# Patient Record
Sex: FEMALE | Race: WHITE | Employment: UNEMPLOYED | URBAN - METROPOLITAN AREA
[De-identification: names, ages, dates, MRNs, and addresses within clinical notes are randomized per-mention and may not be internally consistent; named-entity substitution may affect disease eponyms.]

---

## 2017-11-17 PROBLEM — N81.10 FEMALE CYSTOCELE: Status: ACTIVE | Noted: 2017-11-17

## 2018-03-02 PROBLEM — N81.6 RECTOCELE: Status: ACTIVE | Noted: 2018-03-02

## 2018-11-01 ENCOUNTER — HOSPITAL ENCOUNTER (OUTPATIENT)
Dept: PREADMISSION TESTING | Age: 58
Discharge: HOME | End: 2018-11-01
Payer: COMMERCIAL

## 2018-11-01 LAB
ANION GAP SERPL CALC-SCNC: 8 MMOL/L (ref 3–18)
BUN SERPL-MCNC: 15 MG/DL (ref 7–18)
BUN/CREAT SERPL: 21
CALCIUM SERPL-MCNC: 9.4 MG/DL (ref 8.5–10.1)
CHLORIDE SERPL-SCNC: 103 MMOL/L (ref 100–108)
CO2 SERPL-SCNC: 29 MMOL/L (ref 21–32)
CREAT SERPL-MCNC: 0.72 MG/DL (ref 0.6–1.3)
ERYTHROCYTE [DISTWIDTH] IN BLOOD BY AUTOMATED COUNT: 12.8 % (ref 11.6–14.5)
GLUCOSE SERPL-MCNC: 81 MG/DL (ref 74–99)
HCT VFR BLD AUTO: 42.7 % (ref 35–45)
HGB BLD-MCNC: 14.1 G/DL (ref 12–16)
MCH RBC QN AUTO: 31.4 PG (ref 24–34)
MCHC RBC AUTO-ENTMCNC: 33 G/DL (ref 31–37)
MCV RBC AUTO: 95.1 FL (ref 74–97)
PLATELET # BLD AUTO: 158 K/UL (ref 135–420)
PMV BLD AUTO: 10.3 FL (ref 9.2–11.8)
POTASSIUM SERPL-SCNC: 3.9 MMOL/L (ref 3.5–5.5)
RBC # BLD AUTO: 4.49 M/UL (ref 4.2–5.3)
SODIUM SERPL-SCNC: 140 MMOL/L (ref 136–145)
WBC # BLD AUTO: 6.1 K/UL (ref 4.6–13.2)

## 2018-11-01 PROCEDURE — 93005 ELECTROCARDIOGRAM TRACING: CPT

## 2018-11-01 PROCEDURE — 80048 BASIC METABOLIC PNL TOTAL CA: CPT | Performed by: PLASTIC SURGERY

## 2018-11-01 PROCEDURE — 36415 COLL VENOUS BLD VENIPUNCTURE: CPT | Performed by: PLASTIC SURGERY

## 2018-11-01 PROCEDURE — 85027 COMPLETE CBC AUTOMATED: CPT | Performed by: PLASTIC SURGERY

## 2018-11-02 LAB
ATRIAL RATE: 65 BPM
CALCULATED P AXIS, ECG09: 51 DEGREES
CALCULATED R AXIS, ECG10: -35 DEGREES
CALCULATED T AXIS, ECG11: 79 DEGREES
DIAGNOSIS, 93000: NORMAL
P-R INTERVAL, ECG05: 128 MS
Q-T INTERVAL, ECG07: 474 MS
QRS DURATION, ECG06: 130 MS
QTC CALCULATION (BEZET), ECG08: 492 MS
VENTRICULAR RATE, ECG03: 65 BPM

## 2018-11-14 ENCOUNTER — ANESTHESIA EVENT (OUTPATIENT)
Dept: SURGERY | Age: 58
End: 2018-11-14
Payer: COMMERCIAL

## 2018-11-15 ENCOUNTER — HOSPITAL ENCOUNTER (OUTPATIENT)
Age: 58
Setting detail: OUTPATIENT SURGERY
Discharge: HOME | End: 2018-11-15
Attending: PLASTIC SURGERY | Admitting: PLASTIC SURGERY
Payer: COMMERCIAL

## 2018-11-15 ENCOUNTER — ANESTHESIA (OUTPATIENT)
Dept: SURGERY | Age: 58
End: 2018-11-15
Payer: COMMERCIAL

## 2018-11-15 VITALS
RESPIRATION RATE: 18 BRPM | SYSTOLIC BLOOD PRESSURE: 120 MMHG | TEMPERATURE: 98 F | OXYGEN SATURATION: 99 % | DIASTOLIC BLOOD PRESSURE: 76 MMHG | BODY MASS INDEX: 35.26 KG/M2 | HEIGHT: 63 IN | HEART RATE: 78 BPM | WEIGHT: 199 LBS

## 2018-11-15 PROCEDURE — 74011000250 HC RX REV CODE- 250: Performed by: PLASTIC SURGERY

## 2018-11-15 PROCEDURE — 77030002916 HC SUT ETHLN J&J -A: Performed by: PLASTIC SURGERY

## 2018-11-15 PROCEDURE — 76210000006 HC OR PH I REC 0.5 TO 1 HR: Performed by: PLASTIC SURGERY

## 2018-11-15 PROCEDURE — 77030012508 HC MSK AIRWY LMA AMBU -A: Performed by: ANESTHESIOLOGY

## 2018-11-15 PROCEDURE — 77030011825 HC SUPP SURG PSTOP S2SG -B: Performed by: PLASTIC SURGERY

## 2018-11-15 PROCEDURE — 77030020782 HC GWN BAIR PAWS FLX 3M -B: Performed by: PLASTIC SURGERY

## 2018-11-15 PROCEDURE — 74011250636 HC RX REV CODE- 250/636: Performed by: PLASTIC SURGERY

## 2018-11-15 PROCEDURE — 77030032490 HC SLV COMPR SCD KNE COVD -B: Performed by: PLASTIC SURGERY

## 2018-11-15 PROCEDURE — 77030002986 HC SUT PROL J&J -A: Performed by: PLASTIC SURGERY

## 2018-11-15 PROCEDURE — 77030013567 HC DRN WND RESERV BARD -A: Performed by: PLASTIC SURGERY

## 2018-11-15 PROCEDURE — 76010000131 HC OR TIME 2 TO 2.5 HR: Performed by: PLASTIC SURGERY

## 2018-11-15 PROCEDURE — 74011250636 HC RX REV CODE- 250/636

## 2018-11-15 PROCEDURE — 77010033678 HC OXYGEN DAILY

## 2018-11-15 PROCEDURE — 88305 TISSUE EXAM BY PATHOLOGIST: CPT

## 2018-11-15 PROCEDURE — 76210000024 HC REC RM PH II 2.5 TO 3 HR: Performed by: PLASTIC SURGERY

## 2018-11-15 PROCEDURE — 77030012407 HC DRN WND BARD -B: Performed by: PLASTIC SURGERY

## 2018-11-15 PROCEDURE — 77030008462 HC STPLR SKN PROX J&J -A: Performed by: PLASTIC SURGERY

## 2018-11-15 PROCEDURE — 76060000035 HC ANESTHESIA 2 TO 2.5 HR: Performed by: PLASTIC SURGERY

## 2018-11-15 PROCEDURE — 77030002933 HC SUT MCRYL J&J -A: Performed by: PLASTIC SURGERY

## 2018-11-15 RX ORDER — HYDROMORPHONE HYDROCHLORIDE 2 MG/ML
0.5 INJECTION, SOLUTION INTRAMUSCULAR; INTRAVENOUS; SUBCUTANEOUS
Status: DISCONTINUED | OUTPATIENT
Start: 2018-11-15 | End: 2018-11-15 | Stop reason: HOSPADM

## 2018-11-15 RX ORDER — MIDAZOLAM HYDROCHLORIDE 1 MG/ML
INJECTION, SOLUTION INTRAMUSCULAR; INTRAVENOUS AS NEEDED
Status: DISCONTINUED | OUTPATIENT
Start: 2018-11-15 | End: 2018-11-15 | Stop reason: HOSPADM

## 2018-11-15 RX ORDER — ONDANSETRON 2 MG/ML
INJECTION INTRAMUSCULAR; INTRAVENOUS AS NEEDED
Status: DISCONTINUED | OUTPATIENT
Start: 2018-11-15 | End: 2018-11-15 | Stop reason: HOSPADM

## 2018-11-15 RX ORDER — FENTANYL CITRATE 50 UG/ML
INJECTION, SOLUTION INTRAMUSCULAR; INTRAVENOUS AS NEEDED
Status: DISCONTINUED | OUTPATIENT
Start: 2018-11-15 | End: 2018-11-15 | Stop reason: HOSPADM

## 2018-11-15 RX ORDER — METOCLOPRAMIDE HYDROCHLORIDE 5 MG/ML
INJECTION INTRAMUSCULAR; INTRAVENOUS AS NEEDED
Status: DISCONTINUED | OUTPATIENT
Start: 2018-11-15 | End: 2018-11-15 | Stop reason: HOSPADM

## 2018-11-15 RX ORDER — SODIUM CHLORIDE, SODIUM LACTATE, POTASSIUM CHLORIDE, CALCIUM CHLORIDE 600; 310; 30; 20 MG/100ML; MG/100ML; MG/100ML; MG/100ML
100 INJECTION, SOLUTION INTRAVENOUS CONTINUOUS
Status: DISCONTINUED | OUTPATIENT
Start: 2018-11-15 | End: 2018-11-15 | Stop reason: HOSPADM

## 2018-11-15 RX ORDER — CLINDAMYCIN PHOSPHATE 900 MG/50ML
900 INJECTION, SOLUTION INTRAVENOUS ONCE
Status: COMPLETED | OUTPATIENT
Start: 2018-11-15 | End: 2018-11-15

## 2018-11-15 RX ORDER — HYDROMORPHONE HYDROCHLORIDE 1 MG/ML
INJECTION, SOLUTION INTRAMUSCULAR; INTRAVENOUS; SUBCUTANEOUS AS NEEDED
Status: DISCONTINUED | OUTPATIENT
Start: 2018-11-15 | End: 2018-11-15 | Stop reason: HOSPADM

## 2018-11-15 RX ORDER — CLINDAMYCIN PHOSPHATE 600 MG/50ML
600 INJECTION, SOLUTION INTRAVENOUS ONCE
Status: DISCONTINUED | OUTPATIENT
Start: 2018-11-15 | End: 2018-11-15 | Stop reason: DRUGHIGH

## 2018-11-15 RX ORDER — NALOXONE HYDROCHLORIDE 0.4 MG/ML
0.4 INJECTION, SOLUTION INTRAMUSCULAR; INTRAVENOUS; SUBCUTANEOUS AS NEEDED
Status: DISCONTINUED | OUTPATIENT
Start: 2018-11-15 | End: 2018-11-15 | Stop reason: HOSPADM

## 2018-11-15 RX ORDER — ONDANSETRON 2 MG/ML
4 INJECTION INTRAMUSCULAR; INTRAVENOUS ONCE
Status: DISCONTINUED | OUTPATIENT
Start: 2018-11-15 | End: 2018-11-15 | Stop reason: HOSPADM

## 2018-11-15 RX ORDER — PROPOFOL 10 MG/ML
INJECTION, EMULSION INTRAVENOUS AS NEEDED
Status: DISCONTINUED | OUTPATIENT
Start: 2018-11-15 | End: 2018-11-15 | Stop reason: HOSPADM

## 2018-11-15 RX ORDER — BACITRACIN 500 [USP'U]/G
OINTMENT TOPICAL AS NEEDED
Status: DISCONTINUED | OUTPATIENT
Start: 2018-11-15 | End: 2018-11-15 | Stop reason: HOSPADM

## 2018-11-15 RX ORDER — FLUMAZENIL 0.1 MG/ML
0.2 INJECTION INTRAVENOUS
Status: DISCONTINUED | OUTPATIENT
Start: 2018-11-15 | End: 2018-11-15 | Stop reason: HOSPADM

## 2018-11-15 RX ORDER — LIDOCAINE HYDROCHLORIDE 20 MG/ML
INJECTION, SOLUTION EPIDURAL; INFILTRATION; INTRACAUDAL; PERINEURAL AS NEEDED
Status: DISCONTINUED | OUTPATIENT
Start: 2018-11-15 | End: 2018-11-15 | Stop reason: HOSPADM

## 2018-11-15 RX ORDER — SODIUM CHLORIDE, SODIUM LACTATE, POTASSIUM CHLORIDE, CALCIUM CHLORIDE 600; 310; 30; 20 MG/100ML; MG/100ML; MG/100ML; MG/100ML
125 INJECTION, SOLUTION INTRAVENOUS CONTINUOUS
Status: DISCONTINUED | OUTPATIENT
Start: 2018-11-15 | End: 2018-11-15 | Stop reason: HOSPADM

## 2018-11-15 RX ORDER — FENTANYL CITRATE 50 UG/ML
50 INJECTION, SOLUTION INTRAMUSCULAR; INTRAVENOUS
Status: DISCONTINUED | OUTPATIENT
Start: 2018-11-15 | End: 2018-11-15 | Stop reason: HOSPADM

## 2018-11-15 RX ADMIN — SODIUM CHLORIDE, SODIUM LACTATE, POTASSIUM CHLORIDE, AND CALCIUM CHLORIDE: 600; 310; 30; 20 INJECTION, SOLUTION INTRAVENOUS at 08:18

## 2018-11-15 RX ADMIN — SODIUM CHLORIDE, SODIUM LACTATE, POTASSIUM CHLORIDE, AND CALCIUM CHLORIDE: 600; 310; 30; 20 INJECTION, SOLUTION INTRAVENOUS at 09:18

## 2018-11-15 RX ADMIN — ONDANSETRON 4 MG: 2 INJECTION INTRAMUSCULAR; INTRAVENOUS at 09:18

## 2018-11-15 RX ADMIN — MIDAZOLAM HYDROCHLORIDE 5 MG: 1 INJECTION, SOLUTION INTRAMUSCULAR; INTRAVENOUS at 07:33

## 2018-11-15 RX ADMIN — METOCLOPRAMIDE HYDROCHLORIDE 10 MG: 5 INJECTION INTRAMUSCULAR; INTRAVENOUS at 07:43

## 2018-11-15 RX ADMIN — LIDOCAINE HYDROCHLORIDE 80 MG: 20 INJECTION, SOLUTION EPIDURAL; INFILTRATION; INTRACAUDAL; PERINEURAL at 07:38

## 2018-11-15 RX ADMIN — PROPOFOL 200 MG: 10 INJECTION, EMULSION INTRAVENOUS at 07:38

## 2018-11-15 RX ADMIN — FENTANYL CITRATE 100 MCG: 50 INJECTION, SOLUTION INTRAMUSCULAR; INTRAVENOUS at 08:16

## 2018-11-15 RX ADMIN — SODIUM CHLORIDE, SODIUM LACTATE, POTASSIUM CHLORIDE, AND CALCIUM CHLORIDE 125 ML/HR: 600; 310; 30; 20 INJECTION, SOLUTION INTRAVENOUS at 06:47

## 2018-11-15 RX ADMIN — HYDROMORPHONE HYDROCHLORIDE 1 MG: 1 INJECTION, SOLUTION INTRAMUSCULAR; INTRAVENOUS; SUBCUTANEOUS at 07:33

## 2018-11-15 RX ADMIN — ONDANSETRON 4 MG: 2 INJECTION INTRAMUSCULAR; INTRAVENOUS at 07:43

## 2018-11-15 RX ADMIN — CLINDAMYCIN PHOSPHATE 900 MG: 900 INJECTION, SOLUTION INTRAVENOUS at 07:43

## 2018-11-15 RX ADMIN — HYDROMORPHONE HYDROCHLORIDE 1 MG: 1 INJECTION, SOLUTION INTRAMUSCULAR; INTRAVENOUS; SUBCUTANEOUS at 07:56

## 2018-11-15 NOTE — H&P
History and Physical 
 
Patient: Zhang Baez MRN: [id number]  SSN: [id number] YOB: 1960  Age: 66 y.o. Sex: female Subjective:  
  
Zhang Baez is a 66 y.o. female who has chronic back pain and hypermastia. Past Medical History:  
Diagnosis Date  Acute sinusitis  Ankylosing spondylitis ([address]  Arrhythmia WPW  Asthma   
 sports and allergy induced  Baker's cyst   
 knees  Carpal tunnel syndrome  Chronic pain  Crohn disease ([address]  Deep venous thrombosis ([address] 2009  
 right leg  Diverticulitis  Endometriosis  Fibromyalgia  GERD (gastroesophageal reflux disease)  Jayuya's disease  Heart burn  Hiatal hernia  Hypothyroid  IBD (inflammatory bowel disease)  Irregular heart beat  Nausea & vomiting  Osteoarthritis  Rectocele  Recurrent boils  Rheumatoid arthritis ([address]  SOBOE (shortness of breath on exertion)  TMJ (dislocation of temporomandibular joint)  Ulcerative colitis ([address] Charbel Vogt Parkinson White pattern seen on electrocardiogram   
 
Past Surgical History:  
Procedure Laterality Date  HX GYN    
 rectocele repair x 3  
 HX HEENT Septoplasty 2 [facility]  HX ORTHOPAEDIC Right   
 bunionectomy  HX [facility]AEDIC Right [facility]  HX UROLOGICAL  08/30/2007  
 cystoscopy , ureteral stent  HX UROLOGICAL    
 bladder repair Family History Problem Relation Age of Onset  Arthritis-rheumatoid Mother  Lung Disease Mother  Asthma Mother  Asthma Father  Lung Disease Sister  Diabetes Sister [address] [facility] Arthritis-rheumatoid Sister  Ulcerative Colitis Sister  MS Sister [address] [facility] Arthritis-rheumatoid Sister  Ulcerative Colitis Sister  Lung Disease Sister  Diabetes Brother Social History Tobacco Use  Smoking status: Never Smoker  Smokeless tobacco: Never Used Substance Use Topics  Alcohol use: No  
  
Prior to Admission medications Medication Sig Start Date End Date Taking? Authorizing Provider  
lorcaserin (BELVIQ XR) 20 mg Tb24 Take 20 mg by mouth daily. Yes Provider, Historical  
docusate sodium 100 mg tab Take 100 mg by mouth two (2) times a day. Yes Provider, Historical  
AZO CRANBERRY 975-42-64 mg-mg-million tab Take 1 Cap by mouth daily. Yes Provider, Historical  
calcium carbonate/vitamin D3 (CALCIUM 600 + D,3, PO) Take 1,200 mg by mouth daily. Yes Provider, Historical  
raNITIdine (ZANTAC) 150 mg tablet Take 150 mg by mouth two (2) times a day. Yes Provider, Historical  
levothyroxine (SYNTHROID) 88 mcg tablet Take  by mouth Daily (before breakfast). Yes Provider, Historical  
mesalamine (PENTASA) 500 mg CR capsule Take 1,000 mg by mouth two (2) times a day. Yes Provider, Historical  
folic acid (FOLVITE) 1 mg tablet Take 1 mg by mouth daily. Yes Provider, Historical  
esomeprazole (NEXIUM) 20 mg capsule Take 20 mg by mouth two (2) times a day. Yes Provider, Historical  
estradiol (ESTROGEL) 1.25 gram/actuation glpm 1 Dose by TransDERmal route daily. Yes Provider, Historical  
levocetirizine (XYZAL) 5 mg tablet Take 5 mg by mouth two (2) times a day. Yes Provider, Historical  
tiZANidine (ZANAFLEX) 2 mg capsule Take 2 mg by mouth three (3) times daily as needed. Yes Provider, Historical  
potassium 99 mg tablet Take 99 mg by mouth daily. Yes Provider, Historical  
cholecalciferol (VITAMIN D3) 1,000 unit cap Take 5,000 Units by mouth daily. Yes Provider, Historical  
Biotin 2,500 mcg cap Take 2,500 mcg by mouth daily. Yes Provider, Historical  
infliximab (REMICADE IV) 435 mg by IntraVENous route every six (6) weeks. Provider, Historical  
budesonide-formoterol (SYMBICORT) 160-4.5 mcg/actuation HFAA Take 2 Puffs by inhalation two (2) times daily as needed.     Provider, Historical  
 ondansetron hcl (ZOFRAN, AS HYDROCHLORIDE,) 4 mg tablet Take 1 Tab by mouth every eight (8) hours as needed for Nausea. 3/3/18   Deirdre Gannon MD  
methotrexate (RHEUMATREX) 2.5 mg tablet Take 7.5 mg by mouth Every Saturday. Provider, Historical  
lidocaine (LIDODERM) 5 % 1 Patch by TransDERmal route every twelve (12) hours as needed. Apply patch to the affected area for 12 hours a day and remove for 12 hours a day. Provider, Historical  
temazepam (RESTORIL) 15 mg capsule Take 15 mg by mouth nightly. Provider, Historical  
albuterol (PROAIR HFA) 90 mcg/actuation inhaler Take 2 Puffs by inhalation every four (4) hours as needed for Wheezing. Provider, Historical  
EPINEPHrine (EPIPEN) 0.3 mg/0.3 mL injection 0.3 mg by IntraMUSCular route once as needed. Provider, Historical  
hydrOXYzine HCl (ATARAX) 25 mg tablet Take 25 mg by mouth three (3) times daily as needed for Itching. Provider, Historical  
triamterene-hydroCHLOROthiazide (DYAZIDE) 37.5-25 mg per capsule Take 1 Cap by mouth as needed. Indications: Edema    Provider, Historical  
  
 
Allergies Allergen Reactions  Latex Hives At contact site  Other Food Hives and Other (comments) SUGAR, TOMATO, POTATO, CHICKEN, GARLIC, LETTUCE, MILK, TEA, PEANUTS, CHOCOLATE, CORN, BREWERS YEAST, EGGS, ONIONS, CARROT, RICE  Nickel Itching and Swelling  Penicillins Hives  Adhesive Tape-Silicones Hives Other reaction(s): Dermatological problems, e.g., rash, hives  Bee Venom Protein (Honey Bee) Hives and Rash  Ceclor [Cefaclor] Hives  Celery Itching and Swelling  Ciprofloxacin Hives  Egg Hives Can not take flu vaccine  Methylprednisolone Hives and Rash  Mold Hives  Mushroom Itching and Swelling  Neomycin Other (comments) allergy test  
 Nuts [Tree Nut] Itching and Swelling  Other Plant, Animal, Environmental Hives SEE LIST  Potato Rash and Swelling  Sulfa (Sulfonamide Antibiotics) Rash Break out in rash all over Review of Systems: A comprehensive review of systems was negative except for that written in the History of Present Illness. Objective:  
 
Vitals:  
 11/15/18 7082 BP: 119/74 Pulse: 68 Resp: 18 Temp: 97.6 °F (36.4 °C) SpO2: 100% Weight: 90.3 kg Height: 1.594 m Physical Exam: 
General:  Alert, cooperative, no distress, appears stated age. Eyes:  Conjunctivae/corneas clear. PERRL, EOMs intact. Fundi benign Ears:  Normal TMs and external ear canals both ears. Nose: Nares normal. Septum midline. Mucosa normal. No drainage or sinus tenderness. Mouth/Throat: Lips, mucosa, and tongue normal. Teeth and gums normal.  
Neck: Supple, symmetrical, trachea midline, no adenopathy, thyroid: no enlargment/tenderness/nodules, no carotid bruit and no JVD. Back:   Symmetric, no curvature. ROM normal. No CVA tenderness. Lungs:   Clear to auscultation bilaterally. Heart:  Regular rate and rhythm, S1, S2 normal, no murmur, click, rub or gallop. Abdomen:   Soft, non-tender. Bowel sounds normal. No masses,  No organomegaly. Extremities: Extremities normal, atraumatic, no cyanosis or edema. Pulses: 2+ and symmetric all extremities. Skin: Skin color, texture, turgor normal. No rashes or lesions Lymph nodes: Cervical, supraclavicular, and axillary nodes normal.  
Neurologic: CNII-XII intact. Normal strength, sensation and reflexes throughout. Assessment:  
 
Hospital Problems  Date Reviewed: 11/15/2018 None Plan:  
 
Bilateral breast reduction Signed By: Claudine Joseph MD   
 November 15, 2018

## 2018-11-15 NOTE — PERIOP NOTES
TRANSFER - OUT REPORT: 
 
Verbal report given to [facility], RN (name) on Faviola Whittington  being transferred to phase 2(unit) for routine post - op Report consisted of patients Situation, Background, Assessment and  
Recommendations(SBAR). Information from the following report(s) SBAR, Intake/Output and MAR was reviewed with the receiving nurse. Lines:  
Peripheral IV 11/15/18 Left Hand (Active) Site Assessment Clean, dry, & intact 11/15/2018 10:35 AM  
Phlebitis Assessment 0 11/15/2018 10:35 AM  
Infiltration Assessment 0 11/15/2018 10:35 AM  
Dressing Status Clean, dry, & intact 11/15/2018 10:35 AM  
Dressing Type Transparent;Tape 11/15/2018 10:35 AM  
Hub Color/Line Status Pink; Infusing 11/15/2018 10:35 AM  
  
 
Opportunity for questions and clarification was provided. Patient transported with: 
 Bday

## 2018-11-15 NOTE — DISCHARGE INSTRUCTIONS
Breast Reduction: What to Expect at Home  Your Recovery  Breast reduction surgery removes some of the breast tissue and skin from the breasts. This reshapes and lifts the breasts and reduces their size. It can also make the dark area around the nipple smaller. After surgery, you will probably feel weak. You may feel sore for 2 to 3 weeks. You also may feel pulling or stretching in your breast area. Although you may need pain medicine for a week or two, you can expect to feel better and stronger each day. For several weeks, you may get tired easily or have less energy than usual. You also may have the feeling that fluid is moving in your breasts. This feeling is normal and will go away over time. Stitches usually are removed in 5 to 10 days. Your new breasts may feel firmer and look rounder. Breast reduction may change the normal feeling in your breast. But in time, some feeling may return. Keep in mind that it may take time to get used to your new breasts. You will have swelling at first, but the breasts will soften and develop better shape over time. This care sheet gives you a general idea about how long it will take for you to recover. But each person recovers at a different pace. Follow the steps below to get better as quickly as possible. How can you care for yourself at home? Activity    · Rest when you feel tired. Getting enough sleep will help you recover.     · For about 2 weeks after surgery, avoid lifting anything that would make you strain. This may include heavy grocery bags and milk containers, a heavy briefcase or backpack, cat litter or dog food bags, a vacuum , or a child. Do not lift anything over your head for 2 to 3 weeks.     · Ask your doctor when you can drive again.     · Ask your doctor when it is okay for you to have sex.     · You can take your first shower the day after your drain or bandage is removed. This is usually within about 1 week.  Sometimes doctors say it is okay to shower the day after surgery. Do not take a bath or soak in a hot tub for about 4 weeks.     · You will probably be able to go back to work or your normal routine in 2 to 3 weeks. This depends on the type of work you do and any further treatment. Diet    · You can eat your normal diet. If your stomach is upset, try bland, low-fat foods like plain rice, broiled chicken, toast, and yogurt.     · Drink plenty of fluids (unless your doctor tells you not to).     · You may notice that your bowel movements are not regular right after your surgery. This is common. Try to avoid constipation and straining with bowel movements. Take a fiber supplement. If you have not had a bowel movement after a couple of days, take a mild laxative. Medicines    · Your doctor will tell you if and when you can restart your medicines. He or she will also give you instructions about taking any new medicines.     · If you take blood thinners, such as warfarin (Coumadin), clopidogrel (Plavix), or aspirin, be sure to talk to your doctor. He or she will tell you if and when to start taking those medicines again. Make sure that you understand exactly what your doctor wants you to do.     · Take pain medicines exactly as directed. ? If the doctor gave you a prescription medicine for pain, take it as prescribed. ? If you are not taking a prescription pain medicine, ask your doctor if you can take an over-the-counter medicine.     · If you think your pain medicine is making you sick to your stomach:  ? Take your medicine after meals (unless your doctor has told you not to). ? Ask your doctor for a different pain medicine.     · If you were given medicine for nausea, take it as directed.     · If your doctor prescribed antibiotics, take them as directed. Do not stop taking them just because you feel better. You need to take the full course of antibiotics.    Incision care    · If your doctor gave you specific instructions on how to care for your incision, follow those instructions.     · You may be wearing a special bra that holds your bandages in place after the surgery. Your doctor will tell you when you can stop wearing the bra. Your doctor may want you to wear the bra at night as well as during the day for several weeks. Do not wear an underwire bra for 1 month.     · If you have strips of tape on your incision, leave the tape on for a week or until it falls off. Or follow your doctor's instructions for removing the tape.     · Wash the area daily with warm, soapy water, and pat it dry. Don't use hydrogen peroxide or alcohol, which can slow healing.     · You may cover the area with a gauze bandage if it weeps or rubs against clothing. Change the bandage every day. Consider having someone help you with this. Exercise    · Try to walk each day. Start by walking a little more than you did the day before. Bit by bit, increase the amount you walk. Walking boosts blood flow and helps prevent pneumonia and constipation.     · Avoid strenuous activities, such as bicycle riding, jogging, weight lifting, or aerobic exercise, until your doctor says it is okay.     · Your doctor will tell you when to begin stretching exercises and normal activities.    Ice    · Put ice or a cold pack over your breast for 10 to 20 minutes at a time. Try to do this every 1 to 2 hours for the next 3 days (when you are awake) or until the swelling goes down. Put a thin cloth between the ice and your skin. Other instructions    · You may have one or more drains near your incisions. Your doctor will tell you how to take care of them. Drains are usually removed in the first week after surgery. Follow-up care is a key part of your treatment and safety. Be sure to make and go to all appointments, and call your doctor if you are having problems. It's also a good idea to know your test results and keep a list of the medicines you take. When should you call for help?   Call [phone] anytime you think you may need emergency care. For example, call if:    · You passed out (lost consciousness).     · You have sudden chest pain and shortness of breath, or you cough up blood.    Call your doctor now or seek immediate medical care if:    · You have pain that does not get better after you take pain medicine.     · You have loose stitches, or your incision comes open.     · You are bleeding from the incision.     · You have signs of infection, such as:  ? Increased pain, swelling, warmth, or redness. ? Red streaks leading from the incision. ? Pus draining from the incision. ? A fever.     · You have signs of a blood clot in your leg, such as:  ? Pain in your calf, back of the knee, thigh, or groin. ? Redness and swelling in your leg or groin.    Watch closely for any changes in your health, and be sure to contact your doctor if:    · You do not get better as expected. Where can you learn more? Go to http://charo-ghislaine.info/. Enter T113 in the search box to learn more about \"Breast Reduction: What to Expect at Home. \"  Current as of: April 18, 2018  Content Version: 11.8  © 0807-6617 [employer]. Care instructions adapted under license by [employer] (which disclaims liability or warranty for this information). If you have questions about a medical condition or this instruction, always ask your healthcare professional. [place] [address] any warranty or liability for your use of this information. DISCHARGE SUMMARY from Nurse    PATIENT INSTRUCTIONS:    After general anesthesia or intravenous sedation, for 24 hours or while taking prescription Narcotics:  · Limit your activities  · Do not drive and operate hazardous machinery  · Do not make important personal or business decisions  · Do  not drink alcoholic beverages  · If you have not urinated within 8 hours after discharge, please contact your surgeon on call.     Report the following to your surgeon:  · Excessive pain, swelling, redness or odor of or around the surgical area  · Temperature over 100.5  · Nausea and vomiting lasting longer than 4 hours or if unable to take medications  · Any signs of decreased circulation or nerve impairment to extremity: change in color, persistent  numbness, tingling, coldness or increase pain  · Any questions    What to do at Home:  Recommended activity: No heavy lifting, pushing, pulling until follow up with Dr. Constance Duverney    If you experience any of the following symptoms fever, chills, excessive pain, excessive drainage, foul odor coming from incision site please follow up with Dr. Constance Duverney. *  Please give a list of your current medications to your Primary Care Provider. *  Please update this list whenever your medications are discontinued, doses are      changed, or new medications (including over-the-counter products) are added. *  Please carry medication information at all times in case of emergency situations. These are general instructions for a healthy lifestyle:    No smoking/ No tobacco products/ Avoid exposure to second hand smoke  Surgeon General's Warning:  Quitting smoking now greatly reduces serious risk to your health. Obesity, smoking, and sedentary lifestyle greatly increases your risk for illness    A healthy diet, regular physical exercise & weight monitoring are important for maintaining a healthy lifestyle    You may be retaining fluid if you have a history of heart failure or if you experience any of the following symptoms:  Weight gain of 3 pounds or more overnight or 5 pounds in a week, increased swelling in our hands or feet or shortness of breath while lying flat in bed. Please call your doctor as soon as you notice any of these symptoms; do not wait until your next office visit.     Recognize signs and symptoms of STROKE:    F-face looks uneven    A-arms unable to move or move unevenly    S-speech slurred or non-existent    T-time-call 911 as soon as signs and symptoms begin-DO NOT go       Back to bed or wait to see if you get better-TIME IS BRAIN. Warning Signs of HEART ATTACK     Call 911 if you have these symptoms:   Chest discomfort. Most heart attacks involve discomfort in the center of the chest that lasts more than a few minutes, or that goes away and comes back. It can feel like uncomfortable pressure, squeezing, fullness, or pain.  Discomfort in other areas of the upper body. Symptoms can include pain or discomfort in one or both arms, the back, neck, jaw, or stomach.  Shortness of breath with or without chest discomfort.  Other signs may include breaking out in a cold sweat, nausea, or lightheadedness. Don't wait more than five minutes to call 911 - MINUTES MATTER! Fast action can save your life. Calling 911 is almost always the fastest way to get lifesaving treatment. Emergency Medical Services staff can begin treatment when they arrive -- up to an hour sooner than if someone gets to the hospital by car. The discharge information has been reviewed with the patient and caregiver. The patient and caregiver verbalized understanding. Discharge medications reviewed with the patient and caregiver and appropriate educational materials and side effects teaching were provided.   ___________________________________________________________________________________________________________________________________

## 2018-11-15 NOTE — ANESTHESIA POSTPROCEDURE EVALUATION
Post-Anesthesia Evaluation & Assessment Visit Vitals /71 Pulse 81 Temp 36.2 °C (97.2 °F) Resp 12 Ht 5' 2.75\" (1.594 m) Wt 90.3 kg (199 lb) SpO2 94% BMI 35.53 kg/m² Nausea/Vomiting: Controlled. Post-operative hydration adequate. Pain Scale 1: Visual (11/15/18 1038) Pain Intensity 1: 0 (11/15/18 1038) Managed Pain score at or below stated goal level. Mental status & Level of consciousness: alert and oriented x 3 Neurological status: moves all extremities, sensation grossly intact Pulmonary status: airway patent, adequate oxygenation. Complications related to anesthesia: none Patient has met all PACU discharge requirements.  
 
 
Michelle Lopez DO

## 2018-11-15 NOTE — PERIOP NOTES
Incision made on right breast at 0757 . Second incision made on left breast at 0826. Both incisions closed at 0940.

## 2018-11-15 NOTE — BRIEF OP NOTE
BRIEF OPERATIVE NOTE Date of Procedure: 11/15/2018 Preoperative Diagnosis: MACROMASTIA Postoperative Diagnosis: MACROMASTIA Procedure(s): BILATERAL BREAST REDUCTION **SPEC POP** Surgeon(s) and Role: Abelardo Hernandez MD - Primary Surgical Assistant: none Surgical Staff: 
Circ-1: Gem Valle RN 
Circ-Relief: Sara Espinoza RN Scrub Tech-1: Nadia Marsh Scrub RN-1: Antony Siu RN Surg Asst-1: Magaly Kern Event Time In Time Out Incision Start 7351 Incision Close 0940 Anesthesia: General  
Estimated Blood Loss: 200cc Specimens:  
ID Type Source Tests Collected by Time Destination 1 : right breast tissue Preservative Breast  Manas Polanco MD 11/15/2018 0825 Pathology 2 : left breast tissue  Preservative Breast  Manas Polanco MD 11/15/2018 1996 Pathology Findings: hypermastia Complications: none Implants: * No implants in log *

## 2018-11-15 NOTE — ANESTHESIA PREPROCEDURE EVALUATION
Anesthetic History PONV Pertinent negatives: No increased risk of difficult airway, pseudocholinesterase deficiency, malignant hyperthermia and history of awareness of surgery under anesthesia Review of Systems / Medical History Patient summary reviewed, nursing notes reviewed and pertinent labs reviewed Pulmonary Asthma : well controlled Pertinent negatives: No COPD, recent URI, sleep apnea and smoker Neuro/Psych Within defined limits Cardiovascular Dysrhythmias Pertinent negatives: No hypertension, past MI, CAD, PAD, angina and CHF Exercise tolerance: >4 METS Comments: WPW  
GI/Hepatic/Renal 
  
GERD: well controlled Pertinent negatives: No hepatitis, liver disease and renal disease Endo/Other Hypothyroidism: well controlled Obesity and arthritis Pertinent negatives: No diabetes, hyperthyroidism, morbid obesity and blood dyscrasia Other Findings Comments: Ankylosing spondylitis, RA Physical Exam 
 
Airway Mallampati: II 
TM Distance: 4 - 6 cm Neck ROM: decreased range of motion Mouth opening: Normal 
 
 Cardiovascular Regular rate and rhythm,  S1 and S2 normal,  no murmur, click, rub, or gallop Dental 
No notable dental hx Pulmonary Breath sounds clear to auscultation Abdominal 
GI exam deferred Other Findings Anesthetic Plan ASA: 3 Anesthesia type: general 
 
 
 
 
Induction: Intravenous Anesthetic plan and risks discussed with: Patient and Spouse GA/LMA

## 2018-11-15 NOTE — PERIOP NOTES
Patient discharged via wheelchair to POV, NAD, accompanied by . Both verbalized understanding of all discharge instructions, opportunity for questions offered. Patient armband removed and shredded

## 2018-11-16 NOTE — OP NOTES
[facility] 
OPERATIVE REPORT Yulissa Miranda 
MR#: [id number] : 1960 ACCOUNT #: [de-identified] DATE OF SERVICE: 2018 PREOPERATIVE DIAGNOSIS:  Hypermastia. POSTOPERATIVE DIAGNOSIS:  Hypermastia. PROCEDURE PERFORMED:  Bilateral breast reduction. SURGEON:  Billy Bustillos MD 
 
ANESTHESIA:  General. 
 
ESTIMATED BLOOD LOSS:  200 mL SPECIMENS REMOVED:  Right breast tissue 795 g, left breast tissue 818 g. ASSISTANTS:  Diego Bravo COMPLICATIONS:  None. DISPOSITION:  Home. IMPLANTS:  ***. DESCRIPTION:  The patient was identified and marked in the preoperative holding area. She had SCD stockings placed bilaterally, was given IV antibiotics preoperatively. She was taken to the operating room, placed in the supine position, put under general anesthesia without difficulty. Her chest was prepped and draped in the usual sterile fashion. Beginning on the right side, the areolar diameter was marked with a 38 mm template. The inferior pedicle was deepithelialized. Skin flaps were developed superiorly down to the chest wall. The breast was reduced by removing medial, superior and lateral breast tissue en bloc with the overlying skin. Bleeding was meticulously controlled with electrocautery. Total amount of breast tissue removed from the right side was 795 g. This was then repeated for the left side where 818 g was removed. A 19 round Sukhi drains were placed through inferolateral stab incisions and sewn into place with 2-0 nylon sutures. The skin flaps were then closed with a deep layer of 3-0 Monocryl running subcuticular 4-0 Monocryl. Bacitracin, Adaptic, ABDs and postop bra was applied. The patient tolerated the procedure well and was taken awake and alert to the recovery room in stable condition. Maria E Lantigua MD dictating on behalf of MD JOHN Montilla / MIHAI 
D: 11/15/2018 09:44    
T: 2018 01:07 
 JOB #: [id number] CC:  Zbigniew Faith MD

## 2018-11-20 NOTE — OP NOTES
[address] REPORT    Jessica Carnes  MR#: [id number]  : 1960  ACCOUNT #: [de-identified]   DATE OF SERVICE: 11/15/2018    PREOPERATIVE DIAGNOSIS:  Hypermastia. POSTOPERATIVE DIAGNOSIS:  Hypermastia. PROCEDURE PERFORMED:  Bilateral breast reduction. SURGEON:  Yelena Stout MD    ANESTHESIA:  General.    ESTIMATED BLOOD LOSS:  200 mL    SPECIMENS REMOVED:  Right breast tissue 795 g, left breast tissue 818 g. ASSISTANTS:  Chio Robbins    COMPLICATIONS:  None. DISPOSITION:  Home. IMPLANTS:  None+. DESCRIPTION:  The patient was identified and marked in the preoperative holding area. She had SCD stockings placed bilaterally, was given IV antibiotics preoperatively. She was taken to the operating room, placed in the supine position, put under general anesthesia without difficulty. Her chest was prepped and draped in the usual sterile fashion. Beginning on the right side, the areolar diameter was marked with a 38 mm template. The inferior pedicle was deepithelialized. Skin flaps were developed superiorly down to the chest wall. The breast was reduced by removing medial, superior and lateral breast tissue en bloc with the overlying skin. Bleeding was meticulously controlled with electrocautery. Total amount of breast tissue removed from the right side was 795 g. This was then repeated for the left side where 818 g was removed. A 19 round Sukhi drains were placed through inferolateral stab incisions and sewn into place with 2-0 nylon sutures. The skin flaps were then closed with a deep layer of 3-0 Monocryl running subcuticular 4-0 Monocryl. Bacitracin, Adaptic, ABDs and postop bra was applied. The patient tolerated the procedure well and was taken awake and alert to the recovery room in stable condition. Dennise Odonnell MD dictating on behalf of MD JOHN Hughes / MIHAI  D: 11/15/2018 09:44     T: 2018 01:07  JOB #: [id number]

## 2018-11-20 NOTE — OP NOTES
[address] 
OPERATIVE REPORT Marylou Armstrong 
MR#: [id number] : 1960 ACCOUNT #: [de-identified] DATE OF SERVICE: 11/15/2018 PREOPERATIVE DIAGNOSIS:  Hypermastia. POSTOPERATIVE DIAGNOSIS:  Hypermastia. PROCEDURE PERFORMED:  Bilateral breast reduction. SURGEON:  Yazan Ahumada MD 
 
ANESTHESIA:  General. 
 
ESTIMATED BLOOD LOSS:  200 mL SPECIMENS REMOVED:  Right breast tissue 795 g, left breast tissue 818 g. ASSISTANTS:  Gloria Flores COMPLICATIONS:  None. DISPOSITION:  Home. IMPLANTS:  ***. DESCRIPTION:  The patient was identified and marked in the preoperative holding area. She had SCD stockings placed bilaterally, was given IV antibiotics preoperatively. She was taken to the operating room, placed in the supine position, put under general anesthesia without difficulty. Her chest was prepped and draped in the usual sterile fashion. Beginning on the right side, the areolar diameter was marked with a 38 mm template. The inferior pedicle was deepithelialized. Skin flaps were developed superiorly down to the chest wall. The breast was reduced by removing medial, superior and lateral breast tissue en bloc with the overlying skin. Bleeding was meticulously controlled with electrocautery. Total amount of breast tissue removed from the right side was 795 g. This was then repeated for the left side where 818 g was removed. A 19 round Sukhi drains were placed through inferolateral stab incisions and sewn into place with 2-0 nylon sutures. The skin flaps were then closed with a deep layer of 3-0 Monocryl running subcuticular 4-0 Monocryl. Bacitracin, Adaptic, ABDs and postop bra was applied. The patient tolerated the procedure well and was taken awake and alert to the recovery room in stable condition. Lynnette Elliott MD dictating on behalf of MD JOHN Perry / MIHAI 
D: 11/15/2018 09:44    
T: 2018 01:07 
 JOB #: [id number]

## 2019-08-09 ENCOUNTER — HOSPITAL ENCOUNTER (OUTPATIENT)
Dept: PREADMISSION TESTING | Age: 59
Discharge: HOME | End: 2019-08-09
Payer: COMMERCIAL

## 2019-08-09 LAB
ALBUMIN SERPL-MCNC: 4 G/DL (ref 3.4–5)
ALBUMIN/GLOB SERPL: 1.2 {RATIO} (ref 0.8–1.7)
ALP SERPL-CCNC: 80 U/L (ref 45–117)
ALT SERPL-CCNC: 23 U/L (ref 13–56)
ANION GAP SERPL CALC-SCNC: 8 MMOL/L (ref 3–18)
AST SERPL-CCNC: 19 U/L (ref 10–38)
ATRIAL RATE: 66 BPM
BASOPHILS # BLD: 0 K/UL (ref 0–0.1)
BASOPHILS NFR BLD: 0 % (ref 0–2)
BILIRUB SERPL-MCNC: 0.5 MG/DL (ref 0.2–1)
BUN SERPL-MCNC: 12 MG/DL (ref 7–18)
BUN/CREAT SERPL: 14 (ref 12–20)
CALCIUM SERPL-MCNC: 9 MG/DL (ref 8.5–10.1)
CALCULATED P AXIS, ECG09: 47 DEGREES
CALCULATED R AXIS, ECG10: -19 DEGREES
CALCULATED T AXIS, ECG11: 117 DEGREES
CHLORIDE SERPL-SCNC: 108 MMOL/L (ref 100–111)
CO2 SERPL-SCNC: 29 MMOL/L (ref 21–32)
CREAT SERPL-MCNC: 0.84 MG/DL (ref 0.6–1.3)
DIAGNOSIS, 93000: NORMAL
DIFFERENTIAL METHOD BLD: NORMAL
EOSINOPHIL # BLD: 0.1 K/UL (ref 0–0.4)
EOSINOPHIL NFR BLD: 1 % (ref 0–5)
ERYTHROCYTE [DISTWIDTH] IN BLOOD BY AUTOMATED COUNT: 13.8 % (ref 11.6–14.5)
GLOBULIN SER CALC-MCNC: 3.4 G/DL (ref 2–4)
GLUCOSE SERPL-MCNC: 96 MG/DL (ref 74–99)
HCT VFR BLD AUTO: 41.4 % (ref 35–45)
HGB BLD-MCNC: 13.7 G/DL (ref 12–16)
LYMPHOCYTES # BLD: 2.2 K/UL (ref 0.9–3.6)
LYMPHOCYTES NFR BLD: 37 % (ref 21–52)
MCH RBC QN AUTO: 31.4 PG (ref 24–34)
MCHC RBC AUTO-ENTMCNC: 33.1 G/DL (ref 31–37)
MCV RBC AUTO: 95 FL (ref 74–97)
MONOCYTES # BLD: 0.5 K/UL (ref 0.05–1.2)
MONOCYTES NFR BLD: 8 % (ref 3–10)
NEUTS SEG # BLD: 3.2 K/UL (ref 1.8–8)
NEUTS SEG NFR BLD: 54 % (ref 40–73)
P-R INTERVAL, ECG05: 182 MS
PLATELET # BLD AUTO: 152 K/UL (ref 135–420)
PMV BLD AUTO: 10.6 FL (ref 9.2–11.8)
POTASSIUM SERPL-SCNC: 4.2 MMOL/L (ref 3.5–5.5)
PROT SERPL-MCNC: 7.4 G/DL (ref 6.4–8.2)
Q-T INTERVAL, ECG07: 402 MS
QRS DURATION, ECG06: 84 MS
QTC CALCULATION (BEZET), ECG08: 421 MS
RBC # BLD AUTO: 4.36 M/UL (ref 4.2–5.3)
SODIUM SERPL-SCNC: 145 MMOL/L (ref 136–145)
VENTRICULAR RATE, ECG03: 66 BPM
WBC # BLD AUTO: 6 K/UL (ref 4.6–13.2)

## 2019-08-09 PROCEDURE — 93005 ELECTROCARDIOGRAM TRACING: CPT

## 2019-08-09 PROCEDURE — 85025 COMPLETE CBC W/AUTO DIFF WBC: CPT

## 2019-08-09 PROCEDURE — 36415 COLL VENOUS BLD VENIPUNCTURE: CPT

## 2019-08-09 PROCEDURE — 80053 COMPREHEN METABOLIC PANEL: CPT

## 2019-08-13 ENCOUNTER — HOSPITAL ENCOUNTER (OUTPATIENT)
Dept: MAMMOGRAPHY | Age: 59
Discharge: HOME | End: 2019-08-13
Attending: FAMILY MEDICINE
Payer: COMMERCIAL

## 2019-08-13 DIAGNOSIS — E03.9 HYPOTHYROIDISM, UNSPECIFIED: ICD-10-CM

## 2019-08-13 PROCEDURE — 77080 DXA BONE DENSITY AXIAL: CPT

## 2019-08-16 NOTE — H&P
Patient Name:   Mele Barreto  Account #:  [de-identified]  YOB: 1960    Chief Complaint:  Left shoulder pain; follow up MRI. History of Chief Complaint:  She had the MRI done for her left shoulder. This study is reviewed. It shows tendinosis with some fraying of the bursal surface involving the supraspinatus with bursitis, a small glenohumeral effusion, tendinosis and tenosynovitis of the long head of the biceps, and mild DJD of the AC joint. 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    Allergies, environmental      DL 07/10/2019 -feather mix, bermuda grass, pine, gum, sycamore, cedar, juniper, hickory, cottonwood, white gilberto, cotton linter, red oak, maple mix, green gilberto, maple [place] oak, elm, birch, walnut   Animal allergy      DL 07/10/2019 -mosquito, dust mites, housemites, Fannia, cats, cockroach   Fibromyalgia 2009        GERD         Rheumatoid arthritis         Spinal stenosis         Thyroid disease         Sinusitis         TMJ disorder         Ankylosing spondylitis 2009        Autoimmune disorder         Blepharitis         Carpal tunnel syndrome         Chronic pain syndrome 1992        Crohn's disease         Diverticulitis         Endometriosis         Tammi Danlos syndrome 1992        Occipital neuralgia         Hiatal hernia         Hypothyroidism due to Hashimoto's thyroiditis 2008        IBD - Inflammatory bowel disease         Rheumatoid arthritis 1993        Vaginal hernia  left       [place] Parkinson White syndrome         Asthma         IC - Interstitial cystitis         Lumbar disc degenerative disease         Myalgia/myositis NOS 2018        Lumbar disc displacement         Sacroiliitis            Breast reduction 11/15/2018 bilateral       Bunionectomy 7/97 right       Calcaneal fx repair, 2 toes  right       Cystocele back wall repair 03/02/2018        Cystocele repair 11/17/2017        Cystoscopy w/tissues repair 2016        Hysterectomy 1995        Nasal septoplasty 2012        Rectocele repair 2005        Septoplasty, turbinate reduction, 2016 bilateral       Thumb surgery 2012 right       Tonsillectomy 1994        Transobturator sling & cystoscopy 2007        Turbinate reduction 2012       Allergies:    Ingredient Reaction Medication Name Comment   LEVOFLOXACIN Hives / Skin Rash; Edema Levaquin    BACITRACIN ZINC Hives / Skin Rash; Edema Neosporin ([id number])    ADHESIVE Hives / Skin Rash; Edema  \"please use paper tape\"   NEOMYCIN Hives / Skin Rash; Edema     NICKEL SULFATE Hives / Skin Rash; Edema     NSAIDS (NON-STEROIDAL ANTI-INFLAMMATORY DRUG) Hives / Skin Rash; Edema  ALL   METHYLPREDNISOLONE Hives / Skin Rash; Edema Medrol    LATEX Hives / Skin Rash; Edema     CEFACLOR Hives / Skin Rash; Edema     NEOMYCIN SULFATE Hives / Skin Rash; Edema Neosporin ([id number])    BEE VENOM PROTEIN (HONEY BEE) Hives / Skin Rash; Edema     POLYMYXIN B Hives / Skin Rash; Edema Neosporin ([id number])    EGG Hives / Skin Rash; Edema     SULFA (SULFONAMIDE ANTIBIOTICS) Hives / Skin Rash; Edema     IBUPROFEN Hives / Skin Rash; Edema     AMOXICILLIN Hives / Skin Rash; Edema     PENICILLINS Hives / Skin Rash; Edema     BACITRACIN Hives / Skin Rash; Edema Neosporin ([id number])    TAPE, OCCLUSIVE ADHESIVE Hives / Skin Rash; Edema         Current Medications:    Medication Directions   Remicade 100 mg intravenous solution infuse (435MG/KG)  by intravenous route  every 7 weeks over no less than   hydroxyzine HCl 25 mg tablet take 1 tablet by oral route  every 6 hours as needed for itching   lidocaine 6% cream apply 3 grams 2-3 times daily   Pentasa 500 mg capsule,controlled release take 3 capsule by oral route 2 times every day   ProAir HFA 90 mcg/actuation aerosol inhaler inhale 2 puff by inhalation route  every 4 - 6 hours as needed   ranitidine 150 mg tablet take 1 tablet by oral route 2 times every day   triamterene 37.5 mg-hydrochlorothiazide 25 mg tablet take 1 tablet by oral route  every day   calcium carbonate take 1200mg by oral route every day   Vitamin D3 5,000 unit tablet take 1 tablet by oral route  every day   potassium gluconate 600 mg (99 mg) tablet take 1 daily   Nexium take 22.3mg-2 tablets a day every 12 hours   MegaRed Omega-3 Krill Oil    Move Free Ultra (boron) 40 mg-5 mg-3.3 mg tablet    Move Free Joint Health 750 mg-100 mg-1.65 mg-108 mg tablet    epinephrine 0.3 mg/0.3 mL injection, auto-injector as needed   nystatin 100,000 unit/gram topical cream    terconazole 0.4 % vaginal cream    fluconazole 200 mg tablet    Belviq XR 20 mg tablet,extended release take 1 tablet by oral route  every day   Monurol 3 gram oral packet    Divigel 1 mg/gram (0.1 %) transdermal gel packet apply 1 packet by topical route  every day to upper thigh   nitrofurantoin monohydrate/macrocrystals 100 mg capsule    clindamycin 2 % vaginal cream    Synthroid 112 mcg tablet take 1 tablet by oral route  every day   folic acid 1 mg tablet take 1 tablet by oral route  every day   levocetirizine 5 mg tablet take 1 tablet by oral route  every 12 hours   temazepam 15 mg capsule take 1 capsule by oral route  every day at bedtime as needed   methotrexate sodium 2.5 mg tablet take 8 tablets by oral route once weekly     Social History:    SMOKING  Status Tobacco Type Units Per Day Yrs Used   Never smoker      ALCOHOL  There is no history of alcohol use.      Family History:    Disease Detail Family Member Age Cause of Death Comments   Family history of Alcoholism   N    Arthritis Mother  N Arthritis Father  N    Family history of Arthritis   N    Asthma Father  N    Family history of Asthma   N    Family history of Diabetes   N    Family history of Hypertension   N    Rheumatoid arthritis Mother  N    Rheumatoid arthritis Father  N    Family history of Rheumatoid arthritis   N    Family history of Seizure disorder   N    Family history of Substance abuse   N    Rheumatoid arthritis Sister  N    Ulcerative colitis Sister  N    Fibromatosis Sister  N    COPD Sister  N    Diabetes mellitus Sister  N    Multiple sclerosis Sister  N    Rheumatoid arthritis Sister  N    Crohn's disease Sister  N    Graves disease Sister  N    Sarcoidosis Sister  N    ABD Sister  N    Diverticulosis Sister  N    Bipolar disorder Sister  N    Family history of Autoimmune disease   N    Family history of Carpal tunnel   N    Family history of Cataracts   N    Family history of Deep venous thrombosis   N    Family history of Diverticulosis   N    Family history of Eczema   N    Family history of Endometriosis   N    Family history of Fibromyalgia   N    Family history of Gout   N    Family history of Glaucoma   N    Family history of Graves disease   N    Family history of Hiatal hernia   N    Family history of Hyperparathyroidism   N    Family history of Inflammatory bowel disease   N    Family history of Irritable bowel syndrome   N    Family history of Multiple sclerosis   N    Family history of Ulcerative colitis   N    Family history of Bacon Parkinson White   N      Review of Systems:    Pertinent negatives include fever, fainting and swelling of feet. Vitals:  Date BP Pulse Temp (F) Resp. (per min.) Height (Total in.) Weight (lbs.) BMI   07/11/2019     63.00  35.07     Physical Examination:  General:  Patient in no acute distress. Vital Signs: See database for vital signs. HEENT: Normal.  Neck: Supple. Chest: Clear. Heart: Regular sinus rhythm. Abdomen: Soft, nontender, no adenopathy.    Neurologic: Normal exam.  Extremities:    Physical exam of the left shoulder shows limited overall motion reaching about 100 degrees of forward flexion and 80 degrees of abduction. She has generally good rotation. She has slight pain with thumb down abduction and slight pain with resisted abduction. Impression:  Left shoulder impingement, partial cuff tear, and biceps tendinosis. Plan:  She has had failure of conservative care including physical therapy and multiple cortisone injections. She will be scheduled for left shoulder arthroscopic decompression, possible resection of distal clavicle, probable cuff debridement, possible repair and probable biceps release. She understands the risks and benefits of the procedure. She is ready to proceed. She was given a sling. Postop, she will get Dilaudid.

## 2019-08-19 ENCOUNTER — ANESTHESIA (OUTPATIENT)
Dept: SURGERY | Age: 59
End: 2019-08-19
Payer: COMMERCIAL

## 2019-08-19 ENCOUNTER — ANESTHESIA EVENT (OUTPATIENT)
Dept: SURGERY | Age: 59
End: 2019-08-19
Payer: COMMERCIAL

## 2019-08-19 ENCOUNTER — HOSPITAL ENCOUNTER (OUTPATIENT)
Age: 59
Setting detail: OUTPATIENT SURGERY
Discharge: HOME | End: 2019-08-19
Attending: ORTHOPAEDIC SURGERY | Admitting: ORTHOPAEDIC SURGERY
Payer: COMMERCIAL

## 2019-08-19 VITALS
TEMPERATURE: 97.5 F | RESPIRATION RATE: 18 BRPM | HEART RATE: 64 BPM | HEIGHT: 63 IN | WEIGHT: 198.38 LBS | SYSTOLIC BLOOD PRESSURE: 118 MMHG | OXYGEN SATURATION: 96 % | DIASTOLIC BLOOD PRESSURE: 75 MMHG | BODY MASS INDEX: 35.15 KG/M2

## 2019-08-19 DIAGNOSIS — M75.42 ROTATOR CUFF IMPINGEMENT SYNDROME OF LEFT SHOULDER: Primary | Chronic | ICD-10-CM

## 2019-08-19 PROCEDURE — 74011000250 HC RX REV CODE- 250: Performed by: ORTHOPAEDIC SURGERY

## 2019-08-19 PROCEDURE — 77030018673: Performed by: ORTHOPAEDIC SURGERY

## 2019-08-19 PROCEDURE — 74011250636 HC RX REV CODE- 250/636

## 2019-08-19 PROCEDURE — 76942 ECHO GUIDE FOR BIOPSY: CPT | Performed by: ORTHOPAEDIC SURGERY

## 2019-08-19 PROCEDURE — 74011250636 HC RX REV CODE- 250/636: Performed by: ORTHOPAEDIC SURGERY

## 2019-08-19 PROCEDURE — 77030040361 HC SLV COMPR DVT MDII -B: Performed by: ORTHOPAEDIC SURGERY

## 2019-08-19 PROCEDURE — 77030004451 HC BUR SHV S&N -B: Performed by: ORTHOPAEDIC SURGERY

## 2019-08-19 PROCEDURE — 77030020782 HC GWN BAIR PAWS FLX 3M -B: Performed by: ORTHOPAEDIC SURGERY

## 2019-08-19 PROCEDURE — 77030034478 HC TU IRR ARTHRO PT ARTH -B: Performed by: ORTHOPAEDIC SURGERY

## 2019-08-19 PROCEDURE — 76060000033 HC ANESTHESIA 1 TO 1.5 HR: Performed by: ORTHOPAEDIC SURGERY

## 2019-08-19 PROCEDURE — 77030005326 HC CATH PAIN PMP/Q AVNM -C: Performed by: ORTHOPAEDIC SURGERY

## 2019-08-19 PROCEDURE — 76210000063 HC OR PH I REC FIRST 0.5 HR: Performed by: ORTHOPAEDIC SURGERY

## 2019-08-19 PROCEDURE — 77030002916 HC SUT ETHLN J&J -A: Performed by: ORTHOPAEDIC SURGERY

## 2019-08-19 PROCEDURE — 76010000149 HC OR TIME 1 TO 1.5 HR: Performed by: ORTHOPAEDIC SURGERY

## 2019-08-19 PROCEDURE — 77030018835 HC SOL IRR LR ICUM -A: Performed by: ORTHOPAEDIC SURGERY

## 2019-08-19 PROCEDURE — 77030012711 HC WND ARTHRO ABLT S&N -D: Performed by: ORTHOPAEDIC SURGERY

## 2019-08-19 PROCEDURE — 77030006884 HC BLD SHV INCIS S&N -B: Performed by: ORTHOPAEDIC SURGERY

## 2019-08-19 PROCEDURE — 76210000021 HC REC RM PH II 0.5 TO 1 HR: Performed by: ORTHOPAEDIC SURGERY

## 2019-08-19 RX ORDER — FENTANYL CITRATE 50 UG/ML
25 INJECTION, SOLUTION INTRAMUSCULAR; INTRAVENOUS
Status: DISCONTINUED | OUTPATIENT
Start: 2019-08-19 | End: 2019-08-19 | Stop reason: HOSPADM

## 2019-08-19 RX ORDER — INSULIN LISPRO 100 [IU]/ML
INJECTION, SOLUTION INTRAVENOUS; SUBCUTANEOUS ONCE
Status: DISCONTINUED | OUTPATIENT
Start: 2019-08-19 | End: 2019-08-19 | Stop reason: HOSPADM

## 2019-08-19 RX ORDER — PROPOFOL 10 MG/ML
INJECTION, EMULSION INTRAVENOUS AS NEEDED
Status: DISCONTINUED | OUTPATIENT
Start: 2019-08-19 | End: 2019-08-19 | Stop reason: HOSPADM

## 2019-08-19 RX ORDER — METOCLOPRAMIDE HYDROCHLORIDE 5 MG/ML
INJECTION INTRAMUSCULAR; INTRAVENOUS AS NEEDED
Status: DISCONTINUED | OUTPATIENT
Start: 2019-08-19 | End: 2019-08-19 | Stop reason: HOSPADM

## 2019-08-19 RX ORDER — HYDROMORPHONE HYDROCHLORIDE 2 MG/1
2 TABLET ORAL
Qty: 50 TAB | Refills: 0 | Status: SHIPPED
Start: 2019-08-19 | End: 2019-08-26

## 2019-08-19 RX ORDER — SODIUM CHLORIDE 0.9 % (FLUSH) 0.9 %
5-40 SYRINGE (ML) INJECTION AS NEEDED
Status: DISCONTINUED | OUTPATIENT
Start: 2019-08-19 | End: 2019-08-19 | Stop reason: HOSPADM

## 2019-08-19 RX ORDER — ONDANSETRON 2 MG/ML
INJECTION INTRAMUSCULAR; INTRAVENOUS AS NEEDED
Status: DISCONTINUED | OUTPATIENT
Start: 2019-08-19 | End: 2019-08-19 | Stop reason: HOSPADM

## 2019-08-19 RX ORDER — SODIUM CHLORIDE, SODIUM LACTATE, POTASSIUM CHLORIDE, CALCIUM CHLORIDE 600; 310; 30; 20 MG/100ML; MG/100ML; MG/100ML; MG/100ML
125 INJECTION, SOLUTION INTRAVENOUS CONTINUOUS
Status: DISCONTINUED | OUTPATIENT
Start: 2019-08-19 | End: 2019-08-19 | Stop reason: HOSPADM

## 2019-08-19 RX ORDER — CEFAZOLIN SODIUM/WATER 2 G/20 ML
2 SYRINGE (ML) INTRAVENOUS ONCE
Status: COMPLETED | OUTPATIENT
Start: 2019-08-19 | End: 2019-08-19

## 2019-08-19 RX ORDER — BUPIVACAINE HYDROCHLORIDE 5 MG/ML
INJECTION, SOLUTION EPIDURAL; INTRACAUDAL AS NEEDED
Status: DISCONTINUED | OUTPATIENT
Start: 2019-08-19 | End: 2019-08-19 | Stop reason: HOSPADM

## 2019-08-19 RX ORDER — SCOLOPAMINE TRANSDERMAL SYSTEM 1 MG/1
1 PATCH, EXTENDED RELEASE TRANSDERMAL ONCE
Status: DISCONTINUED | OUTPATIENT
Start: 2019-08-19 | End: 2019-08-19

## 2019-08-19 RX ORDER — NALOXONE HYDROCHLORIDE 0.4 MG/ML
0.2 INJECTION, SOLUTION INTRAMUSCULAR; INTRAVENOUS; SUBCUTANEOUS AS NEEDED
Status: DISCONTINUED | OUTPATIENT
Start: 2019-08-19 | End: 2019-08-19 | Stop reason: HOSPADM

## 2019-08-19 RX ORDER — SODIUM CHLORIDE 0.9 % (FLUSH) 0.9 %
5-40 SYRINGE (ML) INJECTION EVERY 8 HOURS
Status: DISCONTINUED | OUTPATIENT
Start: 2019-08-19 | End: 2019-08-19 | Stop reason: HOSPADM

## 2019-08-19 RX ORDER — MIDAZOLAM HYDROCHLORIDE 1 MG/ML
INJECTION, SOLUTION INTRAMUSCULAR; INTRAVENOUS AS NEEDED
Status: DISCONTINUED | OUTPATIENT
Start: 2019-08-19 | End: 2019-08-19 | Stop reason: HOSPADM

## 2019-08-19 RX ORDER — SODIUM CHLORIDE, SODIUM LACTATE, POTASSIUM CHLORIDE, CALCIUM CHLORIDE 600; 310; 30; 20 MG/100ML; MG/100ML; MG/100ML; MG/100ML
100 INJECTION, SOLUTION INTRAVENOUS CONTINUOUS
Status: DISCONTINUED | OUTPATIENT
Start: 2019-08-19 | End: 2019-08-19 | Stop reason: HOSPADM

## 2019-08-19 RX ORDER — BUPIVACAINE HYDROCHLORIDE AND EPINEPHRINE 5; 5 MG/ML; UG/ML
INJECTION, SOLUTION EPIDURAL; INTRACAUDAL; PERINEURAL AS NEEDED
Status: DISCONTINUED | OUTPATIENT
Start: 2019-08-19 | End: 2019-08-19 | Stop reason: HOSPADM

## 2019-08-19 RX ORDER — ROPIVACAINE HYDROCHLORIDE 5 MG/ML
INJECTION, SOLUTION EPIDURAL; INFILTRATION; PERINEURAL
Status: COMPLETED | OUTPATIENT
Start: 2019-08-19 | End: 2019-08-19

## 2019-08-19 RX ORDER — LIDOCAINE HYDROCHLORIDE 20 MG/ML
INJECTION, SOLUTION EPIDURAL; INFILTRATION; INTRACAUDAL; PERINEURAL AS NEEDED
Status: DISCONTINUED | OUTPATIENT
Start: 2019-08-19 | End: 2019-08-19 | Stop reason: HOSPADM

## 2019-08-19 RX ORDER — DEXAMETHASONE SODIUM PHOSPHATE 4 MG/ML
INJECTION, SOLUTION INTRA-ARTICULAR; INTRALESIONAL; INTRAMUSCULAR; INTRAVENOUS; SOFT TISSUE AS NEEDED
Status: DISCONTINUED | OUTPATIENT
Start: 2019-08-19 | End: 2019-08-19 | Stop reason: HOSPADM

## 2019-08-19 RX ORDER — MAGNESIUM SULFATE 100 %
4 CRYSTALS MISCELLANEOUS AS NEEDED
Status: DISCONTINUED | OUTPATIENT
Start: 2019-08-19 | End: 2019-08-19 | Stop reason: HOSPADM

## 2019-08-19 RX ADMIN — PROPOFOL 200 MG: 10 INJECTION, EMULSION INTRAVENOUS at 07:36

## 2019-08-19 RX ADMIN — ROPIVACAINE HYDROCHLORIDE 20 ML: 5 INJECTION, SOLUTION EPIDURAL; INFILTRATION; PERINEURAL at 06:59

## 2019-08-19 RX ADMIN — MIDAZOLAM HYDROCHLORIDE 5 MG: 1 INJECTION, SOLUTION INTRAMUSCULAR; INTRAVENOUS at 06:53

## 2019-08-19 RX ADMIN — METOCLOPRAMIDE HYDROCHLORIDE 10 MG: 5 INJECTION INTRAMUSCULAR; INTRAVENOUS at 07:44

## 2019-08-19 RX ADMIN — SODIUM CHLORIDE, SODIUM LACTATE, POTASSIUM CHLORIDE, AND CALCIUM CHLORIDE: 600; 310; 30; 20 INJECTION, SOLUTION INTRAVENOUS at 08:06

## 2019-08-19 RX ADMIN — ONDANSETRON 4 MG: 2 INJECTION INTRAMUSCULAR; INTRAVENOUS at 08:38

## 2019-08-19 RX ADMIN — MIDAZOLAM HYDROCHLORIDE 2 MG: 1 INJECTION, SOLUTION INTRAMUSCULAR; INTRAVENOUS at 07:46

## 2019-08-19 RX ADMIN — DEXAMETHASONE SODIUM PHOSPHATE 8 MG: 4 INJECTION, SOLUTION INTRA-ARTICULAR; INTRALESIONAL; INTRAMUSCULAR; INTRAVENOUS; SOFT TISSUE at 07:42

## 2019-08-19 RX ADMIN — SODIUM CHLORIDE, SODIUM LACTATE, POTASSIUM CHLORIDE, AND CALCIUM CHLORIDE 125 ML/HR: 600; 310; 30; 20 INJECTION, SOLUTION INTRAVENOUS at 05:51

## 2019-08-19 RX ADMIN — Medication 2 G: at 07:39

## 2019-08-19 RX ADMIN — LIDOCAINE HYDROCHLORIDE 100 MG: 20 INJECTION, SOLUTION EPIDURAL; INFILTRATION; INTRACAUDAL; PERINEURAL at 07:36

## 2019-08-19 NOTE — PERIOP NOTES
Discharge instructions completed - opportunity for questions - AVS med list reviewed for duplicates  - strong radial pulses, brisk cap refill noted bilateral extremities

## 2019-08-19 NOTE — OP NOTES
PREOPERATIVE DIAGNOSES:    1. Impingement left shoulder  2. Degenerative arthritis of the acromioclavicular joint. 3. Possible biceps instability      POSTOPERATIVE DIAGNOSES:    1. Impingement left shoulder  2. Degenerative arthritis of the acromioclavicular joint. 3. Labral degeneration  4. Partial cuff tear  5. Frozen shoulder      PROCEDURES PERFORMED:    1. Arthroscopic decompression left shoulder  2. Resection distal clavicle. 3. Cuff debridement  4. Labral debridement  5. Manipulation under anesthesia    SURGEON:  Guevara Anderson. Satish Peña MD    ANESTHESIOLOGIST: Anesthesiologist: Murray Bryant MD  CRNA: Fifi Meeks CRNA    ANESTHESIA:   General anesthesia after scalene block. ASSISTANTS:  Circ-1: Rafaela Obrien RN  Circ-Relief: Cayetano Eason RN  Scrub Tech-2: Shama Ray  Scrub Private/Assistant: Violet Magana CNA  Surg Asst-1: Beth Benavides    COMPLICATIONS:  None. BLOOD LOSS:  Minimal.      DESCRIPTION OF PROCEDURE:  This 66y.o.-year-old female, with a history of persistent pain in the left shoulder, unresponsive to conservative care. . The patient had a MRI showing the above noted findings and was then scheduled for surgery. PROCEDURE:  The patient was taken to the operating room and given general anesthesia after a scalene block. When it was adequate, the left shoulder was examined and showed limited motion with no gross instability. The shoulder was gently manipulated into full forward flexion and rotatation with the audible and palpable release of adhesions. Repeat exam showed no instability. The patient was placed in a right lateral decubitus position. The left shoulder was placed in traction, prepped and draped in a normal manner and injected with 30 mL of 1% Marcaine with Epinephrine. Anterior and posterior stab wounds were made, and a standard arthroscopic examination was performed. This revealed an intact supraspinatus. .  The biceps but the superior labrum was frayed and was debrided with electrocautery. The articular surfaces of the joint appeared normal.  The instruments were then redirected in the subacromial space. A lateral portal was established and a limited bursectomy was carried out. The rotator cuff was torn superficially and was debrided. Impingement signs were noted. The soft tissue was removed from the anterior acromion and distal clavicle, including the coracoacromial ligament. There was a sharp hooked appearance on the anterior acromion, and severe arthritic changes of the [facility] joint with complete loss of joint cartilage, and large inferior osteophytes. A high-speed bur was used to perform an acromioplasty. The same level of resection was carried across the distal clavicle. The arthroscope was switched to the lateral portal to assure that adequate bone removal had been achieved, and the result was a flat acromion. The [facility] joint was approached directly through the anterior portal.  The most medial few millimeters of the acromion and the distal centimeter of the clavicle were removed arthroscopically. Bleeding points were treated with the electrocautery wand for hemostasis. The instruments were removed. The pain pump catheter was left in the subacromial space. The wounds were closed using 3-0 nylon suture. A sterile compressive dressing was applied, along with a sling. The patient left the operating room in satisfactory condition.

## 2019-08-19 NOTE — PERIOP NOTES
TRANSFER - OUT REPORT:    Verbal report given to Trey Knutson RN (name) on Amy Silva  being transferred to phase 2(unit) for routine post - op       Report consisted of patients Situation, Background, Assessment and   Recommendations(SBAR). Information from the following report(s) SBAR, Intake/Output and MAR was reviewed with the receiving nurse. Lines:   Peripheral IV 08/19/19 Posterior;Right Hand (Active)   Site Assessment Clean, dry, & intact 8/19/2019  9:14 AM   Phlebitis Assessment 0 8/19/2019  9:14 AM   Infiltration Assessment 0 8/19/2019  9:14 AM   Dressing Status Clean, dry, & intact 8/19/2019  9:14 AM   Dressing Type Transparent;Tape 8/19/2019  9:14 AM   Hub Color/Line Status Pink; Infusing 8/19/2019  9:14 AM        Opportunity for questions and clarification was provided.       Patient transported with:   griddig

## 2019-08-19 NOTE — PERIOP NOTES
In preparation for Left Shoulder nerve block 3L NC applied to patient, 3 lead EKG obtained and placed on chart, and anesthesia consent obtained. Pulse oximeter applied to finger, blood pressure cuff in place, and blood pressure cycling every 3 minutes.

## 2019-08-19 NOTE — PERIOP NOTES
Spoke with Dr Hamilton Castillo regarding pt's PCN allergy reaction is hives he still wants Ancef ordered Rocio RN is aware

## 2019-08-19 NOTE — PERIOP NOTES
Patient stated that her nurse said she would be receiving nausea medication  IV because due to her adhesive allergy she cannot have the scopalamine pathc.

## 2019-08-19 NOTE — ANESTHESIA PROCEDURE NOTES
Peripheral Block    Start time: 8/19/2019 6:53 AM  End time: 8/19/2019 7:00 AM  Performed by: Don Roberto MD  Authorized by: Don Roberto MD       Pre-procedure: Indications: at surgeon's request, post-op pain management and procedure for pain    Preanesthetic Checklist: patient identified, risks and benefits discussed, site marked, timeout performed, anesthesia consent given and patient being monitored    Timeout Time: 06:53          Block Type:   Block Type:   Interscalene  Laterality:  Left  Monitoring:  Standard ASA monitoring, oxygen, frequent vital sign checks, heart rate, continuous pulse ox and responsive to questions  Injection Technique:  Single shot  Procedures: ultrasound guided and nerve stimulator    Patient Position: supine  Prep: chlorhexidine    Location:  Interscalene  Needle Type:  Stimuplex  Needle Gauge:  21 G  Needle Localization:  Ultrasound guidance and nerve stimulator  Motor Response comment:  Biceps 0.7 mA    Assessment:  Number of attempts:  1  Injection Assessment:  Incremental injection every 5 mL, negative aspiration for CSF, no paresthesia, ultrasound image on chart, local visualized surrounding nerve on ultrasound, negative aspiration for blood, no intravascular symptoms and low pressure verified by pressure monitor

## 2019-08-19 NOTE — ANESTHESIA PREPROCEDURE EVALUATION
Relevant Problems   No relevant active problems       Anesthetic History     PONV     Comments: Does NOT want scop patch; will give iv anti-emetics. Review of Systems / Medical History  Patient summary reviewed, nursing notes reviewed and pertinent labs reviewed    Pulmonary            Asthma : well controlled       Neuro/Psych   Within defined limits           Cardiovascular            Dysrhythmias         Comments: WPW seen once; Dr Natalia Obrien evaluated and stated it is unlikely. Cardiac w/u negative. GI/Hepatic/Renal     GERD: well controlled           Endo/Other      Hypothyroidism: well controlled  Arthritis     Other Findings   Comments: Chronic pain syndrome. Physical Exam    Airway  Mallampati: I  TM Distance: 4 - 6 cm  Neck ROM: normal range of motion   Mouth opening: Normal     Cardiovascular    Rhythm: regular  Rate: normal         Dental    Dentition: Bridges and Caps/crowns     Pulmonary  Breath sounds clear to auscultation               Abdominal  GI exam deferred       Other Findings            Anesthetic Plan    ASA: 3  Anesthesia type: general      Post-op pain plan if not by surgeon: peripheral nerve block single    Induction: Intravenous  Anesthetic plan and risks discussed with: Patient and Family      R/B of block discussed including nerve injury.

## 2019-08-19 NOTE — INTERVAL H&P NOTE
H&P Update:  Cony Freeman was seen and examined. History and physical has been reviewed. The patient has been examined. There have been no significant clinical changes since the completion of the originally dated History and Physical.  Patient identified by surgeon; surgical site was confirmed by patient and surgeon.

## 2019-08-19 NOTE — PERIOP NOTES
Reviewed PTA medication list with patient/caregiver and patient/caregiver denies any additional medications. Patient admits to having a responsible adult care for them for at least 24 hours after surgery.     Dual skin assessment completed by Yuliet SCHMITT and Pedro Mace RN.

## 2019-08-19 NOTE — DISCHARGE INSTRUCTIONS
Follow pre printed discharge instructions sheet from Dr. Jami España office  Contact Dr. Jami España office with any post op questions or concerns at [phone] - [phone]    Ice and elevate   Resume pre hospital diet  Drink plenty of fluids  Ambulate in house  Follow Dr. Jami España instructions for sling use    Remove OnQ pump in 3 days ( see handout for removal instructions )  Follow up with Dr. Hamilton Castillo in 1 week      DISCHARGE SUMMARY from Nurse    PATIENT INSTRUCTIONS:    After general anesthesia or intravenous sedation, for 24 hours or while taking prescription Narcotics:  · Limit your activities  · Do not drive and operate hazardous machinery  · Do not make important personal or business decisions  · Do  not drink alcoholic beverages  · If you have not urinated within 8 hours after discharge, please contact your surgeon on call. Report the following to your surgeon:  · Excessive pain, swelling, redness or odor of or around the surgical area  · Temperature over 100.5  · Nausea and vomiting lasting longer than 4 hours or if unable to take medications  · Any signs of decreased circulation or nerve impairment to extremity: change in color, persistent  numbness, tingling, coldness or increase pain  · Any questions    What to do at Home:  Recommended activity: Ambulate in house and No lifting, Driving, or Strenuous exercise until advised,     If you experience any of the following symptoms fever, chills, uncontrollable pain , active bleeding or drainage , circulation changes ( cold, blue, numb extremity ), nausea , vomiting, please follow up with Dr. Hamilton Castillo. *  Please give a list of your current medications to your Primary Care Provider. *  Please update this list whenever your medications are discontinued, doses are      changed, or new medications (including over-the-counter products) are added. *  Please carry medication information at all times in case of emergency situations.     These are general instructions for a healthy lifestyle:    No smoking/ No tobacco products/ Avoid exposure to second hand smoke  Surgeon General's Warning:  Quitting smoking now greatly reduces serious risk to your health. Obesity, smoking, and sedentary lifestyle greatly increases your risk for illness    A healthy diet, regular physical exercise & weight monitoring are important for maintaining a healthy lifestyle    You may be retaining fluid if you have a history of heart failure or if you experience any of the following symptoms:  Weight gain of 3 pounds or more overnight or 5 pounds in a week, increased swelling in our hands or feet or shortness of breath while lying flat in bed. Please call your doctor as soon as you notice any of these symptoms; do not wait until your next office visit. Patient armband removed and shredded    The discharge information has been reviewed with the patient and caregiver. The patient and caregiver verbalized understanding. Discharge medications reviewed with the patient and caregiver and appropriate educational materials and side effects teaching were provided.   ___________________________________________________________________________________________________________________________________

## 2019-08-19 NOTE — ANESTHESIA POSTPROCEDURE EVALUATION
.  Post-Anesthesia Evaluation & Assessment    Visit Vitals  /73   Pulse 78   Temp 36.2 °C (97.2 °F)   Resp 15   Ht 5' 3\" (1.6 m)   Wt 90 kg (198 lb 6 oz)   SpO2 94%   BMI 35.14 kg/m²       Nausea/Vomiting: no nausea    Post-operative hydration adequate.     Pain score (VAS): 0    Mental status & Level of consciousness: alert and oriented x 3    Neurological status: moves all extremities, sensation grossly intact    Pulmonary status: airway patent, no supplemental oxygen required    Complications related to anesthesia: none    Additional comments:

## 2019-11-20 ENCOUNTER — ANESTHESIA EVENT (OUTPATIENT)
Dept: SURGERY | Age: 59
End: 2019-11-20
Payer: COMMERCIAL

## 2019-11-21 ENCOUNTER — HOSPITAL ENCOUNTER (OUTPATIENT)
Age: 59
Setting detail: OUTPATIENT SURGERY
Discharge: HOME | End: 2019-11-21
Attending: PLASTIC SURGERY | Admitting: PLASTIC SURGERY
Payer: COMMERCIAL

## 2019-11-21 ENCOUNTER — ANESTHESIA (OUTPATIENT)
Dept: SURGERY | Age: 59
End: 2019-11-21
Payer: COMMERCIAL

## 2019-11-21 VITALS
SYSTOLIC BLOOD PRESSURE: 122 MMHG | TEMPERATURE: 97.8 F | BODY MASS INDEX: 35.62 KG/M2 | HEART RATE: 63 BPM | RESPIRATION RATE: 16 BRPM | WEIGHT: 201.06 LBS | HEIGHT: 63 IN | DIASTOLIC BLOOD PRESSURE: 72 MMHG | OXYGEN SATURATION: 100 %

## 2019-11-21 PROCEDURE — 74011250636 HC RX REV CODE- 250/636: Performed by: PLASTIC SURGERY

## 2019-11-21 PROCEDURE — 77030006643: Performed by: SPECIALIST

## 2019-11-21 PROCEDURE — 77030011265 HC ELECTRD BLD HEX COVD -A: Performed by: PLASTIC SURGERY

## 2019-11-21 PROCEDURE — 76210000022 HC REC RM PH II 1.5 TO 2 HR: Performed by: PLASTIC SURGERY

## 2019-11-21 PROCEDURE — 77030033827 HC SLV COMPR SCD THGH COVD -B: Performed by: PLASTIC SURGERY

## 2019-11-21 PROCEDURE — 77030002933 HC SUT MCRYL J&J -A: Performed by: PLASTIC SURGERY

## 2019-11-21 PROCEDURE — 77030013189 HC SLV COMPR SCD HUNT -B: Performed by: PLASTIC SURGERY

## 2019-11-21 PROCEDURE — 74011250636 HC RX REV CODE- 250/636: Performed by: SPECIALIST

## 2019-11-21 PROCEDURE — 77030008462 HC STPLR SKN PROX J&J -A: Performed by: PLASTIC SURGERY

## 2019-11-21 PROCEDURE — 77030008683 HC TU ET CUF COVD -A: Performed by: SPECIALIST

## 2019-11-21 PROCEDURE — 77030020782 HC GWN BAIR PAWS FLX 3M -B: Performed by: PLASTIC SURGERY

## 2019-11-21 PROCEDURE — 77030008477 HC STYL SATN SLP COVD -A: Performed by: SPECIALIST

## 2019-11-21 PROCEDURE — 76010000149 HC OR TIME 1 TO 1.5 HR: Performed by: PLASTIC SURGERY

## 2019-11-21 PROCEDURE — 77030002946 HC SUT NRLN J&J -B: Performed by: PLASTIC SURGERY

## 2019-11-21 PROCEDURE — 74011000250 HC RX REV CODE- 250: Performed by: PLASTIC SURGERY

## 2019-11-21 PROCEDURE — 77030040361 HC SLV COMPR DVT MDII -B: Performed by: PLASTIC SURGERY

## 2019-11-21 PROCEDURE — 74011000250 HC RX REV CODE- 250: Performed by: SPECIALIST

## 2019-11-21 PROCEDURE — 76060000033 HC ANESTHESIA 1 TO 1.5 HR: Performed by: PLASTIC SURGERY

## 2019-11-21 PROCEDURE — 77030003029 HC SUT VCRL J&J -B: Performed by: PLASTIC SURGERY

## 2019-11-21 PROCEDURE — 76210000063 HC OR PH I REC FIRST 0.5 HR: Performed by: PLASTIC SURGERY

## 2019-11-21 PROCEDURE — 77030018836 HC SOL IRR NACL ICUM -A: Performed by: PLASTIC SURGERY

## 2019-11-21 RX ORDER — MIDAZOLAM HYDROCHLORIDE 1 MG/ML
INJECTION, SOLUTION INTRAMUSCULAR; INTRAVENOUS AS NEEDED
Status: DISCONTINUED | OUTPATIENT
Start: 2019-11-21 | End: 2019-11-21 | Stop reason: HOSPADM

## 2019-11-21 RX ORDER — NEOSTIGMINE METHYLSULFATE 1 MG/ML
INJECTION, SOLUTION INTRAVENOUS AS NEEDED
Status: DISCONTINUED | OUTPATIENT
Start: 2019-11-21 | End: 2019-11-21 | Stop reason: HOSPADM

## 2019-11-21 RX ORDER — PROPOFOL 10 MG/ML
INJECTION, EMULSION INTRAVENOUS AS NEEDED
Status: DISCONTINUED | OUTPATIENT
Start: 2019-11-21 | End: 2019-11-21 | Stop reason: HOSPADM

## 2019-11-21 RX ORDER — ONDANSETRON 2 MG/ML
INJECTION INTRAMUSCULAR; INTRAVENOUS AS NEEDED
Status: DISCONTINUED | OUTPATIENT
Start: 2019-11-21 | End: 2019-11-21 | Stop reason: HOSPADM

## 2019-11-21 RX ORDER — TRIAMTERENE AND HYDROCHLOROTHIAZIDE 37.5; 25 MG/1; MG/1
1 CAPSULE ORAL DAILY
COMMUNITY

## 2019-11-21 RX ORDER — LIDOCAINE HYDROCHLORIDE 20 MG/ML
INJECTION, SOLUTION EPIDURAL; INFILTRATION; INTRACAUDAL; PERINEURAL AS NEEDED
Status: DISCONTINUED | OUTPATIENT
Start: 2019-11-21 | End: 2019-11-21 | Stop reason: HOSPADM

## 2019-11-21 RX ORDER — ONDANSETRON 2 MG/ML
4 INJECTION INTRAMUSCULAR; INTRAVENOUS ONCE
Status: DISCONTINUED | OUTPATIENT
Start: 2019-11-21 | End: 2019-11-21 | Stop reason: HOSPADM

## 2019-11-21 RX ORDER — DEXAMETHASONE SODIUM PHOSPHATE 4 MG/ML
INJECTION, SOLUTION INTRA-ARTICULAR; INTRALESIONAL; INTRAMUSCULAR; INTRAVENOUS; SOFT TISSUE AS NEEDED
Status: DISCONTINUED | OUTPATIENT
Start: 2019-11-21 | End: 2019-11-21 | Stop reason: HOSPADM

## 2019-11-21 RX ORDER — FENTANYL CITRATE 50 UG/ML
INJECTION, SOLUTION INTRAMUSCULAR; INTRAVENOUS AS NEEDED
Status: DISCONTINUED | OUTPATIENT
Start: 2019-11-21 | End: 2019-11-21 | Stop reason: HOSPADM

## 2019-11-21 RX ORDER — CLINDAMYCIN PHOSPHATE 900 MG/50ML
900 INJECTION, SOLUTION INTRAVENOUS ONCE
Status: COMPLETED | OUTPATIENT
Start: 2019-11-21 | End: 2019-11-21

## 2019-11-21 RX ORDER — SODIUM CHLORIDE, SODIUM LACTATE, POTASSIUM CHLORIDE, CALCIUM CHLORIDE 600; 310; 30; 20 MG/100ML; MG/100ML; MG/100ML; MG/100ML
50 INJECTION, SOLUTION INTRAVENOUS CONTINUOUS
Status: DISCONTINUED | OUTPATIENT
Start: 2019-11-21 | End: 2019-11-21 | Stop reason: HOSPADM

## 2019-11-21 RX ORDER — NALOXONE HYDROCHLORIDE 0.4 MG/ML
0.1 INJECTION, SOLUTION INTRAMUSCULAR; INTRAVENOUS; SUBCUTANEOUS
Status: DISCONTINUED | OUTPATIENT
Start: 2019-11-21 | End: 2019-11-21 | Stop reason: HOSPADM

## 2019-11-21 RX ORDER — OXYCODONE AND ACETAMINOPHEN 5; 325 MG/1; MG/1
1 TABLET ORAL AS NEEDED
Status: DISCONTINUED | OUTPATIENT
Start: 2019-11-21 | End: 2019-11-21 | Stop reason: HOSPADM

## 2019-11-21 RX ORDER — MESALAMINE 500 MG/1
1000 CAPSULE, EXTENDED RELEASE ORAL 3 TIMES DAILY
COMMUNITY

## 2019-11-21 RX ORDER — SODIUM CHLORIDE, SODIUM LACTATE, POTASSIUM CHLORIDE, CALCIUM CHLORIDE 600; 310; 30; 20 MG/100ML; MG/100ML; MG/100ML; MG/100ML
125 INJECTION, SOLUTION INTRAVENOUS CONTINUOUS
Status: DISCONTINUED | OUTPATIENT
Start: 2019-11-21 | End: 2019-11-21 | Stop reason: HOSPADM

## 2019-11-21 RX ORDER — GLYCOPYRROLATE 0.2 MG/ML
INJECTION INTRAMUSCULAR; INTRAVENOUS AS NEEDED
Status: DISCONTINUED | OUTPATIENT
Start: 2019-11-21 | End: 2019-11-21 | Stop reason: HOSPADM

## 2019-11-21 RX ORDER — ROCURONIUM BROMIDE 10 MG/ML
INJECTION, SOLUTION INTRAVENOUS AS NEEDED
Status: DISCONTINUED | OUTPATIENT
Start: 2019-11-21 | End: 2019-11-21 | Stop reason: HOSPADM

## 2019-11-21 RX ORDER — HYDROMORPHONE HYDROCHLORIDE 1 MG/ML
0.5 INJECTION, SOLUTION INTRAMUSCULAR; INTRAVENOUS; SUBCUTANEOUS
Status: DISCONTINUED | OUTPATIENT
Start: 2019-11-21 | End: 2019-11-21 | Stop reason: HOSPADM

## 2019-11-21 RX ORDER — FENTANYL CITRATE 50 UG/ML
25 INJECTION, SOLUTION INTRAMUSCULAR; INTRAVENOUS
Status: DISCONTINUED | OUTPATIENT
Start: 2019-11-21 | End: 2019-11-21 | Stop reason: HOSPADM

## 2019-11-21 RX ADMIN — CLINDAMYCIN PHOSPHATE 900 MG: 900 INJECTION, SOLUTION INTRAVENOUS at 07:42

## 2019-11-21 RX ADMIN — SODIUM CHLORIDE, SODIUM LACTATE, POTASSIUM CHLORIDE, AND CALCIUM CHLORIDE 50 ML/HR: 600; 310; 30; 20 INJECTION, SOLUTION INTRAVENOUS at 10:00

## 2019-11-21 RX ADMIN — PROPOFOL 170 MG: 10 INJECTION, EMULSION INTRAVENOUS at 07:47

## 2019-11-21 RX ADMIN — GLYCOPYRROLATE 0.4 MG: 0.2 INJECTION INTRAMUSCULAR; INTRAVENOUS at 08:44

## 2019-11-21 RX ADMIN — Medication 2 MG: at 08:45

## 2019-11-21 RX ADMIN — FENTANYL CITRATE 50 MCG: 50 INJECTION, SOLUTION INTRAMUSCULAR; INTRAVENOUS at 07:56

## 2019-11-21 RX ADMIN — FENTANYL CITRATE 50 MCG: 50 INJECTION, SOLUTION INTRAMUSCULAR; INTRAVENOUS at 07:46

## 2019-11-21 RX ADMIN — SODIUM CHLORIDE, SODIUM LACTATE, POTASSIUM CHLORIDE, AND CALCIUM CHLORIDE 125 ML/HR: 600; 310; 30; 20 INJECTION, SOLUTION INTRAVENOUS at 06:57

## 2019-11-21 RX ADMIN — ONDANSETRON HYDROCHLORIDE 4 MG: 2 INJECTION INTRAMUSCULAR; INTRAVENOUS at 08:02

## 2019-11-21 RX ADMIN — Medication 30 MG: at 07:47

## 2019-11-21 RX ADMIN — DEXAMETHASONE SODIUM PHOSPHATE 4 MG: 4 INJECTION, SOLUTION INTRAMUSCULAR; INTRAVENOUS at 08:02

## 2019-11-21 RX ADMIN — GLYCOPYRROLATE 0.2 MG: 0.2 INJECTION INTRAMUSCULAR; INTRAVENOUS at 07:42

## 2019-11-21 RX ADMIN — LIDOCAINE HYDROCHLORIDE 60 MG: 20 INJECTION, SOLUTION EPIDURAL; INFILTRATION; INTRACAUDAL; PERINEURAL at 07:47

## 2019-11-21 RX ADMIN — SODIUM CHLORIDE, SODIUM LACTATE, POTASSIUM CHLORIDE, AND CALCIUM CHLORIDE 125 ML/HR: 600; 310; 30; 20 INJECTION, SOLUTION INTRAVENOUS at 08:57

## 2019-11-21 RX ADMIN — FENTANYL CITRATE 50 MCG: 50 INJECTION, SOLUTION INTRAMUSCULAR; INTRAVENOUS at 08:31

## 2019-11-21 RX ADMIN — SODIUM CHLORIDE, SODIUM LACTATE, POTASSIUM CHLORIDE, AND CALCIUM CHLORIDE 50 ML/HR: 600; 310; 30; 20 INJECTION, SOLUTION INTRAVENOUS at 10:44

## 2019-11-21 RX ADMIN — FENTANYL CITRATE 50 MCG: 50 INJECTION, SOLUTION INTRAMUSCULAR; INTRAVENOUS at 08:15

## 2019-11-21 RX ADMIN — SODIUM CHLORIDE, SODIUM LACTATE, POTASSIUM CHLORIDE, AND CALCIUM CHLORIDE: 600; 310; 30; 20 INJECTION, SOLUTION INTRAVENOUS at 08:23

## 2019-11-21 RX ADMIN — MIDAZOLAM 2 MG: 1 INJECTION INTRAMUSCULAR; INTRAVENOUS at 07:42

## 2019-11-21 NOTE — PERIOP NOTES
TRANSFER - OUT REPORT:    Verbal report given to Sara Portillo(name) on Daniel Hull  being transferred to phase 2(unit) for routine post - op       Report consisted of patients Situation, Background, Assessment and   Recommendations(SBAR). Information from the following report(s) SBAR, Kardex, OR Summary, Procedure Summary, Intake/Output and MAR was reviewed with the receiving nurse. Lines:   Peripheral IV 11/21/19 Distal;Posterior;Right Wrist (Active)   Site Assessment Clean, dry, & intact 11/21/2019  9:09 AM   Phlebitis Assessment 0 11/21/2019  9:09 AM   Infiltration Assessment 0 11/21/2019  9:09 AM   Dressing Status Clean, dry, & intact 11/21/2019  9:09 AM   Dressing Type Transparent;Tape 11/21/2019  9:09 AM   Hub Color/Line Status Pink; Infusing 11/21/2019  6:54 AM   Alcohol Cap Used No 11/21/2019  6:54 AM        Opportunity for questions and clarification was provided.       Patient transported with:   Registered Nurse  Tech

## 2019-11-21 NOTE — ANESTHESIA POSTPROCEDURE EVALUATION
Post-Anesthesia Evaluation and Assessment    Cardiovascular Function/Vital Signs  Visit Vitals  /84   Pulse 97   Temp 36.1 °C (97 °F)   Resp 17   Ht 5' 3\" (1.6 m)   Wt 91.2 kg (201 lb 1 oz)   SpO2 95%   BMI 35.62 kg/m²       Patient is status post Procedure(s):  REVISE/EXCISE SCARRING BILATERAL BREAST REDUCTION,. Nausea/Vomiting: Controlled. Postoperative hydration reviewed and adequate. Pain:  Pain Scale 1: Numeric (0 - 10) (11/21/19 0906)  Pain Intensity 1: 0 (11/21/19 0906)   Managed. Neurological Status:   Neuro (WDL): Exceptions to WDL (11/21/19 0955)   At baseline. Mental Status and Level of Consciousness: Baseline and appropriate for discharge. Pulmonary Status:   O2 Device: Room air (11/21/19 0908)   Adequate oxygenation and airway patent. Complications related to anesthesia: None    Post-anesthesia assessment completed. No concerns. Patient has met all discharge requirements.     Signed By: Crystal Silveira MD    November 21, 2019

## 2019-11-21 NOTE — ANESTHESIA PREPROCEDURE EVALUATION
Relevant Problems   No relevant active problems       Anesthetic History     PONV          Review of Systems / Medical History  Patient summary reviewed, nursing notes reviewed and pertinent labs reviewed    Pulmonary            Asthma (exercise induced only)        Neuro/Psych   Within defined limits           Cardiovascular            Dysrhythmias       Exercise tolerance: >4 METS  Comments: WPW - having ablation next week - no problems with multiple recent GA   GI/Hepatic/Renal     GERD           Endo/Other      Hypothyroidism  Arthritis     Other Findings   Comments: Very allergy prone - easily breaks out in hives         Physical Exam    Airway  Mallampati: II  TM Distance: 4 - 6 cm  Neck ROM: normal range of motion   Mouth opening: Normal     Cardiovascular               Dental    Dentition: Bridges and Caps/crowns     Pulmonary                 Abdominal         Other Findings            Anesthetic Plan    ASA: 3  Anesthesia type: general          Induction: Intravenous  Anesthetic plan and risks discussed with: Patient and Spouse      No scopolamine - caused hives previously  Surgeon needs prone position - plan ETT - patient aware

## 2019-11-21 NOTE — H&P
History and Physical    Patient: Dee Fowler MRN: [id number]  SSN: [id number]    YOB: 1960  Age: 64 y.o. Sex: female      Subjective:      Dee Fowler is a 64 y.o. female who has painful scars following breast reduction.      Past Medical History:   Diagnosis Date    Acute sinusitis     Ankylosing spondylitis (HCC)     Arrhythmia     WPW    Asthma     sports and allergy induced    Baker's cyst     knees    Carpal tunnel syndrome     Chronic pain     Crohn disease ([address]     Deep venous thrombosis ([address] 2009    right leg    Diverticulitis     Endometriosis     Fibromyalgia     GERD (gastroesophageal reflux disease)     Mcmechen's disease     Heart burn     Hiatal hernia     Hypothyroid     IBD (inflammatory bowel disease)     Irregular heart beat     Nausea & vomiting     Osteoarthritis     Rectocele     Recurrent boils     Rheumatoid arthritis (HCC)     SOBOE (shortness of breath on exertion)     TMJ (dislocation of temporomandibular joint)     Ulcerative colitis (HCC)     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 HX GYN      rectocele repair x 3    HX HEENT      Septoplasty    HX HYSTERECTOMY  1995    HX ORTHOPAEDIC Right     bunionectomy    HX ORTHOPAEDIC Right 2014    Thumb surgery    HX SHOULDER ARTHROSCOPY Left     HX TONSILLECTOMY  1994    HX UROLOGICAL  08/30/2007    cystoscopy , ureteral stent    HX UROLOGICAL      bladder repair      Family History   Problem Relation Age of Onset   Nga Cone Arthritis-rheumatoid Mother     Lung Disease Mother     Asthma Mother     Asthma Father     Lung Disease Sister     Diabetes Sister     Arthritis-rheumatoid Sister     Ulcerative Colitis Sister     MS Sister     Arthritis-rheumatoid Sister     Ulcerative Colitis Sister     Lung Disease Sister     Diabetes Brother      Social History     Tobacco Use    Smoking status: Never Smoker    Smokeless tobacco: Never Used   Substance Use Topics    Alcohol use: No      Prior to Admission medications    Medication Sig Start Date End Date Taking? Authorizing Provider   OTHER Take 1,200 mg by mouth daily. Yes Provider, Historical   mesalamine (PENTASA) 500 mg CR capsule Take 1,500 mg by mouth two (2) times a day. Yes Provider, Historical   triamterene-hydroCHLOROthiazide (DYAZIDE) 37.5-25 mg per capsule Take 1 Cap by mouth as needed. Only when traveling   Yes Provider, Historical   levothyroxine (SYNTHROID) 112 mcg tablet Take 112 mcg by mouth Daily (before breakfast). Yes Provider, Historical   levocetirizine (XYZAL) 5 mg tablet Take 5 mg by mouth nightly. Yes Provider, Historical   fexofenadine (ALLEGRA) 180 mg tablet Take 180 mg by mouth daily. Yes Provider, Historical   raNITIdine (ZANTAC) 150 mg tablet Take 150 mg by mouth two (2) times a day. Yes Provider, Historical   folic acid (FOLVITE) 1 mg tablet Take 1 mg by mouth daily. Yes Provider, Historical   esomeprazole (NEXIUM) 20 mg capsule Take 20 mg by mouth two (2) times a day. Yes Provider, Historical   temazepam (RESTORIL) 15 mg capsule Take 15 mg by mouth nightly. Yes Provider, Historical   potassium 99 mg tablet Take 99 mg by mouth daily. Yes Provider, Historical   cholecalciferol (VITAMIN D3) 1,000 unit cap Take 5,000 Units by mouth daily. Yes Provider, Historical   diclofenac (VOLTAREN) 1 % gel Apply 2 g to affected area four (4) times daily. Provider, Historical   infliximab (REMICADE IV) 435 mg by IntraVENous route every six (6) weeks. Provider, Historical   lorcaserin (BELVIQ XR) 20 mg Tb24 Take 20 mg by mouth daily. Provider, Historical   budesonide-formoterol (SYMBICORT) 160-4.5 mcg/actuation HFAA Take 2 Puffs by inhalation two (2) times daily as needed. Provider, Historical   methotrexate (RHEUMATREX) 2.5 mg tablet Take 20 mg by mouth Every Saturday. Provider, Historical   estradiol (ESTROGEL) 1.25 gram/actuation glpm 1 Dose by TransDERmal route daily. Provider, Historical   albuterol (PROAIR HFA) 90 mcg/actuation inhaler Take 2 Puffs by inhalation every four (4) hours as needed for Wheezing. Provider, Historical   EPINEPHrine (EPIPEN) 0.3 mg/0.3 mL injection 0.3 mg by IntraMUSCular route once as needed. Provider, Historical   hydrOXYzine HCl (ATARAX) 25 mg tablet Take 25 mg by mouth three (3) times daily as needed for Itching. Provider, Historical        Allergies   Allergen Reactions    Latex Hives     At contact site    Other Food Hives and Other (comments)     SUGAR, TOMATO, POTATO, CHICKEN, GARLIC, LETTUCE, MILK, TEA, PEANUTS, CHOCOLATE, CORN, BREWERS YEAST, EGGS, ONIONS, CARROT, RICE    Nickel Itching and Swelling    Penicillins Hives    Adhesive Tape-Silicones Hives     Other reaction(s): Dermatological problems, e.g., rash, hives    Bee Venom Protein (Honey Bee) Hives and Rash    Ceclor [Cefaclor] Hives    Celery Itching and Swelling    Ciprofloxacin Hives    Egg Hives     Can not take flu vaccine    Levaquin [Levofloxacin] Rash    Methylprednisolone Hives and Rash    Mold Hives    Mushroom Itching and Swelling    Neomycin Other (comments)     allergy test    Neosporin [Benzalkonium Chloride] Itching    Nsaids (Non-Steroidal Anti-Inflammatory Drug) Hives    Nuts [Tree Nut] Itching and Swelling    Other Plant, Animal, Environmental Hives     SEE LIST    Potato Rash and Swelling    Sulfa (Sulfonamide Antibiotics) Rash     Break out in rash all over       Review of Systems:  A comprehensive review of systems was negative except for that written in the History of Present Illness.     Objective:     Vitals:    11/21/19 0605 11/21/19 0612   BP: 152/78    Pulse: 65    Resp: 16    Temp: 97.1 °F (36.2 °C)    SpO2: 100%    Weight: 91.2 kg   Height:  1.6 m        Physical Exam:  General:  Alert, cooperative, no distress, appears stated age. Eyes:  Conjunctivae/corneas clear. PERRL, EOMs intact. Fundi benign   Ears:  Normal TMs and external ear canals both ears. Nose: Nares normal. Septum midline. Mucosa normal. No drainage or sinus tenderness. Mouth/Throat: Lips, mucosa, and tongue normal. Teeth and gums normal.   Neck: Supple, symmetrical, trachea midline, no adenopathy, thyroid: no enlargment/tenderness/nodules, no carotid bruit and no JVD. Back:   Symmetric, no curvature. ROM normal. No CVA tenderness. Lungs:   Clear to auscultation bilaterally. Heart:  Regular rate and rhythm, S1, S2 normal, no murmur, click, rub or gallop. Abdomen:   Soft, non-tender. Bowel sounds normal. No masses,  No organomegaly. Extremities: Extremities normal, atraumatic, no cyanosis or edema. Pulses: 2+ and symmetric all extremities. Skin: Skin color, texture, turgor normal. No rashes or lesions   Lymph nodes: Cervical, supraclavicular, and axillary nodes normal.   Neurologic: CNII-XII intact. Normal strength, sensation and reflexes throughout.        Assessment:     Hospital Problems  Date Reviewed: 11/21/2019    None          Plan:     Revision bilateral breast scars    Signed By: Madina Zapata MD     November 21, 2019

## 2019-11-21 NOTE — DISCHARGE INSTRUCTIONS
Make sure to follow-up with Dr. Viviana Thorne as scheduled next week. Advance to a regular diet as tolerated. May shower tomorrow after removing bandages. Wear a soft supportive bra. No heavy lifting or pushing or pulling with arms. Call Dr. Viviana Thorne at [phone] with any postoperative concerns or problems. DISCHARGE SUMMARY from Nurse    PATIENT INSTRUCTIONS:    After general anesthesia or intravenous sedation, for 24 hours or while taking prescription Narcotics:  · Limit your activities  · Do not drive and operate hazardous machinery  · Do not make important personal or business decisions  · Do  not drink alcoholic beverages  · If you have not urinated within 8 hours after discharge, please contact your surgeon on call. Report the following to your surgeon:  · Excessive pain, swelling, redness or odor of or around the surgical area  · Temperature over 100.5  · Nausea and vomiting lasting longer than 4 hours or if unable to take medications  · Any signs of decreased circulation or nerve impairment to extremity: change in color, persistent  numbness, tingling, coldness or increase pain  · Any questions    What to do at Home:  Recommended activity: Activity as tolerated and no driving for today,     If you experience any of the following symptoms as per above, please follow up with Dr. Viviana Thorne    *  Please give a list of your current medications to your Primary Care Provider. *  Please update this list whenever your medications are discontinued, doses are      changed, or new medications (including over-the-counter products) are added. *  Please carry medication information at all times in case of emergency situations. These are general instructions for a healthy lifestyle:    No smoking/ No tobacco products/ Avoid exposure to second hand smoke  Surgeon General's Warning:  Quitting smoking now greatly reduces serious risk to your health.     Obesity, smoking, and sedentary lifestyle greatly increases your risk for illness    A healthy diet, regular physical exercise & weight monitoring are important for maintaining a healthy lifestyle    You may be retaining fluid if you have a history of heart failure or if you experience any of the following symptoms:  Weight gain of 3 pounds or more overnight or 5 pounds in a week, increased swelling in our hands or feet or shortness of breath while lying flat in bed. Please call your doctor as soon as you notice any of these symptoms; do not wait until your next office visit. Patient armband removed and shredded      The discharge information has been reviewed with the patient and spouse. The patient and spouse verbalized understanding. Discharge medications reviewed with the patient and spouse and appropriate educational materials and side effects teaching were provided.   ___________________________________________________________________________________________________________________________________

## 2019-11-21 NOTE — OP NOTES
[facility]  OPERATIVE REPORT    Name:  Rafaela Sauceda  MR#:   [id number]  :  1960  ACCOUNT #:  [de-identified]  DATE OF SERVICE:  2019    PREOPERATIVE DIAGNOSIS:  Hypertrophic scarring bilateral breast reduction incisions. POSTOPERATIVE DIAGNOSIS:  Hypertrophic scarring bilateral breast reduction incisions. PROCEDURE PERFORMED:  Revision of bilateral scarring breast reduction incisions. SURGEON:  Jeromy Hong MD    ASSISTANT:  none. ANESTHESIA:  General.    COMPLICATIONS:  None. SPECIMENS REMOVED:  Breast skin and scar. IMPLANTS:  None. ESTIMATED BLOOD LOSS:  Minimal.    DISPOSITION:  To recovery room in stable condition. INDICATIONS FOR PROCEDURE:  The patient is a 61-year-old female who underwent breast reduction earlier this year. She did well from this operation except where she has developed some hypertrophic scarring at the lateral most portion of her breast reduction incision on both the lateral chest.  These scars extend almost around her back and so revision of this will have to be done with the patient in the operating room in a prone position, do this for her under general anesthesia. DETAILS OF PROCEDURE:  The patient was identified and marked in the preop holding area. She had SCD stockings placed bilaterally, and was given IV antibiotics preoperatively. She was taken to the operating room, placed in the supine position and put under general anesthesia without difficulty. She was rolled to the prone position onto the operating room table, was adequately padded with chest rolls and egg crate under all bony prominences. Her back was then prepped and draped in the usual sterile fashion. Beginning on the right side, a large ellipse of skin measuring approximately 18 cm x 5 cm at its widest point was incised encompassing the lateral portion of the breast reduction scar and surrounding skin above and below the scar.   The incision was deepened down through the subcutaneous tissue to the muscle layer at which point the specimen was removed. The bleeding was meticulously controlled with electrocautery. The deep space was closed down by multiple sutures with 3-0 Monocryl. The skin was closed with a dermal layer of 3-0 Monocryl and running subcuticular 3-0 Monocryl. This was then repeated for the left side where a similar 18 x 5 cm section of skin and scar was removed. Mastisol, Proxi-Strips, ABDs were placed over the closure. The patient tolerated the procedure well and was taken awake and alert to the recovery room in stable condition. Milbert Bence, MD      [id number]  D:  11/21/2019 9:21  T:  11/21/2019 15:12  JOB #:  [id number]  CC:   Narendra Reid MD

## 2019-11-21 NOTE — BRIEF OP NOTE
BRIEF OPERATIVE NOTE    Date of Procedure: 11/21/2019   Preoperative Diagnosis: HYPERTROPHIC SCARRING  Postoperative Diagnosis: HYPERTROPHIC SCARRING    Procedure(s):  REVISE/EXCISE SCARRING BILATERAL BREAST REDUCTION,  Surgeon(s) and Role:     Nithya Dodson MD - Primary         Surgical Assistant: none    Surgical Staff:  Circ-1: Sherman Arreola RN  Scrub Tech-1: Richmond Meyers  Surg Asst-1: Hannah Alves  Event Time In Time Out   Incision Start 6377    Incision Close 0843      Anesthesia: General   Estimated Blood Loss: minimal  Specimens: * No specimens in log *   Findings: hypertropic scars bilateral chest   Complications: none  Implants: * No implants in log *

## 2020-07-08 ENCOUNTER — HOSPITAL ENCOUNTER (OUTPATIENT)
Dept: LAB | Age: 60
Discharge: HOME | End: 2020-07-08
Payer: COMMERCIAL

## 2020-07-08 PROCEDURE — 88305 TISSUE EXAM BY PATHOLOGIST: CPT

## 2020-07-21 NOTE — PERIOP NOTES
Received call from Maria Elena Rodriguez that Dr. Dougie Cross wants pt to have an anesthesia consult. Called pt. She stated it concerned her EKG being abnormal. Pt will have PAT on 7-. Will wait to see if anesthesia clears her- cardio info in [place]. Left message for Maria Elena Rodriguez regarding this.

## 2020-07-24 ENCOUNTER — HOSPITAL ENCOUNTER (OUTPATIENT)
Dept: PREADMISSION TESTING | Age: 60
Discharge: HOME | End: 2020-07-24
Payer: COMMERCIAL

## 2020-07-24 PROCEDURE — 87635 SARS-COV-2 COVID-19 AMP PRB: CPT

## 2020-07-26 LAB — SARS-COV-2, COV2NT: NOT DETECTED

## 2020-07-28 NOTE — H&P
Patient Name:  Diane Cohn  Account #:  [de-identified]  YOB: 1960      History of Chief Complaint:  Maretta Jeans comes in today for a preoperative visit for her surgery on 07/31/20. She was evaluated previously for tarsal tunnel as well as osteochondral lesion of the talus. Her previous MRI showed a large medial talar dome chondral injury. The nerve studies did not show definite tarsal tunnel. She does have symptoms that are suspicious for tarsal tunnel, as well as pain in her forefoot. She states that she was diagnosed previously with a Alexis's neuroma, and she would like to know if we can address that at the time of surgery. Past Medical/Surgical History:    Disease/Disorder Date Side Surgery Date Side Comment   Allergies, environmental      DL 07/10/2019 -feather mix, bermuda grass, pine, gum, sycamore, cedar, juniper, hickory, cottonwood, white gilberto, cotton linter, red oak, maple mix, green gilberto, maple [place] oak, elm, birch, walnut   Allergies, food      DL 07/10/2019 -sugar, tomatoes, potato, chicken, garlic, lettuce, celery, milk, tea, peanuts, chocolate, corn, brewers yeast, eggs, onions, mushrooms, carrot, rice.    Animal allergy      DL 07/10/2019 -mosquito, dust mites, housemites, Fannia, cats, cockroach   Ankylosing spondylitis 2009        Arthritis         Asthma         Autoimmune disorder         Blepharitis         Carpal tunnel syndrome         Chronic pain syndrome 1992        Crohn's disease         Diverticulitis         DVT - Deep vein thrombosis of lower limb 2009 right       [place] syndrome 1992        Endometriosis         Fibromyalgia 2009        GERD         Hiatal hernia         Hypothyroidism due to Hashimoto's thyroiditis 2008        IBD - Inflammatory bowel disease         IC - Interstitial cystitis         Intestinal problems         Lumbar disc degenerative disease         Lumbar disc displacement         Myalgia/myositis NOS 2018        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       Occipital neuralgia         Rheumatoid arthritis         Rheumatoid arthritis 1993        Sacroiliitis         Sinusitis         Spinal stenosis         Thyroid disease         TMJ disorder         UC - Ulcerative colitis         Vaginal hernia  left       [place] Parkinson White syndrome            Arthroscopy shoulder 08/19/2019 left       Breast reduction 11/15/2018 bilateral       Breast reduction 11/21/2019  [facility] 05/13/2020 - revision, remove scar tissue      Bunionectomy 07/1997 right       Calcaneal fx repair, 2 toes  right       Cystocele back wall repair 03/02/2018        Cystocele repair 11/17/2017        Cystocele repair 01/17/2020        Cystoscopy w/tissues repair 2016        Hysterectomy 1995        Nasal septoplasty 2012        Rectocele repair 2005        Septoplasty, turbinate reduction, 2016 bilateral       SVT ablation 11/29/2019        Thumb surgery 2012 right       Tonsillectomy 1994        Transobturator sling & cystoscopy 2007        Turbinate reduction 2012       Allergies:    Ingredient Reaction Medication Name Comment   LEVOFLOXACIN Hives / Skin Rash; Edema Levaquin    BACITRACIN ZINC Hives / Skin Rash; Edema Neosporin ([id number])    ADHESIVE Hives / Skin Rash; Edema  \"please use paper tape\"   NEOMYCIN Hives / Skin Rash; Edema     NICKEL SULFATE Hives / Skin Rash; Edema     NSAIDS (NON-STEROIDAL ANTI-INFLAMMATORY DRUG) Hives / Skin Rash; Edema  ALL   METHYLPREDNISOLONE Hives / Skin Rash; Edema Medrol    LATEX Hives / Skin Rash; Edema     CEFACLOR Hives / Skin Rash; Edema     NEOMYCIN SULFATE Hives / Skin Rash; Edema Neosporin ([id number])    BEE VENOM PROTEIN (HONEY BEE) Hives / Skin Rash; Edema     POLYMYXIN B Hives / Skin Rash; Edema Neosporin ([id number])    EGG Hives / Skin Rash; Edema     SULFA (SULFONAMIDE ANTIBIOTICS) Hives / Skin Rash; Edema     IBUPROFEN Hives / Skin Rash; Edema     AMOXICILLIN Hives / Skin Rash; Edema PENICILLINS Hives / Skin Rash; Edema     BACITRACIN Hives / Skin Rash; Edema Neosporin ([id number])    TAPE, OCCLUSIVE ADHESIVE Hives / Skin Rash; Edema         Current Medications:    Medication Directions   calcium carbonate take 1200mg by oral route every day   clindamycin 2 % vaginal cream    Divigel 1 mg/gram (0.1 %) transdermal gel packet apply 1 packet by topical route  every day to upper thigh   epinephrine 0.3 mg/0.3 mL injection, auto-injector as needed   fluconazole 323 mg tablet    folic acid 1 mg tablet take 1 tablet by oral route  every day   hydroxyzine HCl 25 mg tablet take 1 tablet by oral route  every 6 hours as needed for itching   levocetirizine 5 mg tablet take 1 tablet by oral route  every 12 hours   lidocaine 6% cream apply 3 grams 2-3 times daily   MegaRed Omega-3 Krill Oil    methotrexate sodium 2.5 mg tablet take 8 tablets by oral route once weekly   Monurol 3 gram oral packet    [address] 750 mg-100 mg-1.65 mg-108 mg tablet    Move Free Ultra (boron) 40 mg-5 mg-3.3 mg tablet    Neurontin 100 mg capsule take 1 capsule by oral route 2 times every day   Nexium take 22.3mg-2 tablets a day every 12 hours   nitrofurantoin monohydrate/macrocrystals 100 mg capsule    nystatin 100,000 unit/gram topical cream    Pentasa 500 mg capsule,controlled release take 3 capsule by oral route 2 times every day   potassium gluconate 600 mg (99 mg) tablet take 1 daily   ProAir HFA 90 mcg/actuation aerosol inhaler inhale 2 puff by inhalation route  every 4 - 6 hours as needed   Remicade 100 mg intravenous solution infuse (435MG/KG)  by intravenous route  every 7 weeks over no less than   Synthroid 112 mcg tablet take 1 tablet by oral route  every day   temazepam 15 mg capsule take 1 capsule by oral route  every day at bedtime as needed   terconazole 0.4 % vaginal cream    triamterene 37.5 mg-hydrochlorothiazide 25 mg tablet take 1 tablet by oral route  every day   Vitamin D3 5,000 unit tablet take 1 tablet by oral route  every day     Social History:    SMOKING  Status Tobacco Type Units Per Day Yrs Used   Never smoker        ALCOHOL  There is no history of alcohol use. Family History:    Disease Detail Family Member Age Cause of Death Comments   Family history of Alcoholism   N    Arthritis Mother  N    Arthritis Father  N    Family history of Arthritis   N    Asthma Father  N    Family history of Asthma   N    Family history of Diabetes   N    Family history of Hypertension   N    Rheumatoid arthritis Mother  N    Rheumatoid arthritis Father  N    Family history of Rheumatoid arthritis   N    Family history of Seizure disorder   N    Family history of Substance abuse   N    Rheumatoid arthritis Sister  N    Ulcerative colitis Sister  N    Fibromatosis Sister  N    COPD Sister  N    Diabetes mellitus Sister  N    Multiple sclerosis Sister  N    Rheumatoid arthritis Sister  N    Crohn's disease Sister  N    Graves disease Sister  N    Sarcoidosis Sister  N    ABD Sister  N    Diverticulosis Sister  N    Bipolar disorder Sister  N    Family history of Autoimmune disease   N    Family history of Carpal tunnel   N    Family history of Cataracts   N    Family history of Deep venous thrombosis   N    Family history of Diverticulosis   N    Family history of Eczema   N    Family history of Endometriosis   N    Family history of Fibromyalgia   N    Family history of Gout   N    Family history of Glaucoma   N    Family history of Graves disease   N    Family history of Hiatal hernia   N    Family history of Hyperparathyroidism   N    Family history of Inflammatory bowel disease   N    Family history of Irritable bowel syndrome   N    Family history of Multiple sclerosis   N    Family history of Ulcerative colitis   N    Family history of Bacon Parkinson White   N      Review of Systems:    Pertinent negatives include fever, gout and muscle weakness.     Vitals:  Date BP Pulse Temp (F) Resp. (per min.) Height (Total in.) Weight (lbs.) BMI   05/18/2020     63.00  35.07   07/11/2019     63.00  35.07     Physical Examination:  Examination of her right foot and ankle today shows she is tender over the medial side of the tibiotalar joint. She has tenderness over the tarsal tunnel. She has thickening of the mid aspect of the plantar fascia. She has well-healed surgical scar over the 4th and 5th toes. The 3rd toe is slightly elevated. She is tender in the 3rd webspace with a palpable Alexis's click. Radiograph Examination: Her x-rays and MRI reviewed which show a right large talar dome chondral injury. Impression:  Right-sided large osteochondral lesion and tarsal tunnel ganglion and Alexis's neuroma. Plan:  She did not do physical therapy. She did the home program. She has a large lesion which compared to her previous MRI, which she brought with her today, it does appear slightly bigger than it was which was 17 x 10 and is now 22 x 10. We reviewed our plan for surgery was examination under anesthesia for possible Brostrom, but she does not feel that loose, open tarsal release, removal of the subtalar joint ganglion which is along the FHL tendon, ankle scope with DeNovo grafting, as well as now we will add a open removal of a Alexis's neuroma through her previous incision. She has multiple drug allergies. She has tried conservative measures and she wished to proceed to surgery. We will proceed with surgery on 07/31/20. She does have a history of DVT. We will prescribe her Eliquis postoperatively for DVT prophylaxis until she is up and moving. She will be in the splint for the first one to two weeks with early motion to try to keep from catching as well as the keep the graft mobile and microfracture healing. She understands the plan. We will set her up for surgery at [facility]. Conchita Cross MD/ dwayne    CC Providers:  Dr. Reynaldo Austin

## 2020-07-30 ENCOUNTER — ANESTHESIA EVENT (OUTPATIENT)
Dept: SURGERY | Age: 60
End: 2020-07-30
Payer: COMMERCIAL

## 2020-07-31 ENCOUNTER — ANESTHESIA (OUTPATIENT)
Dept: SURGERY | Age: 60
End: 2020-07-31
Payer: COMMERCIAL

## 2020-07-31 ENCOUNTER — HOSPITAL ENCOUNTER (OUTPATIENT)
Age: 60
Setting detail: OUTPATIENT SURGERY
Discharge: HOME | End: 2020-07-31
Attending: ORTHOPAEDIC SURGERY | Admitting: ORTHOPAEDIC SURGERY
Payer: COMMERCIAL

## 2020-07-31 VITALS
RESPIRATION RATE: 12 BRPM | TEMPERATURE: 97.4 F | HEIGHT: 63 IN | OXYGEN SATURATION: 99 % | HEART RATE: 57 BPM | WEIGHT: 221.25 LBS | SYSTOLIC BLOOD PRESSURE: 100 MMHG | BODY MASS INDEX: 39.2 KG/M2 | DIASTOLIC BLOOD PRESSURE: 57 MMHG

## 2020-07-31 DIAGNOSIS — G57.51 TARSAL TUNNEL SYNDROME, RIGHT: Primary | ICD-10-CM

## 2020-07-31 PROCEDURE — 76210000027 HC REC RM PH II 3.5 TO 4 HR: Performed by: ORTHOPAEDIC SURGERY

## 2020-07-31 PROCEDURE — 77030012508 HC MSK AIRWY LMA AMBU -A: Performed by: SPECIALIST

## 2020-07-31 PROCEDURE — 74011000250 HC RX REV CODE- 250: Performed by: SPECIALIST

## 2020-07-31 PROCEDURE — 74011000258 HC RX REV CODE- 258: Performed by: ORTHOPAEDIC SURGERY

## 2020-07-31 PROCEDURE — 77030034478 HC TU IRR ARTHRO PT ARTH -B: Performed by: ORTHOPAEDIC SURGERY

## 2020-07-31 PROCEDURE — C9290 INJ, BUPIVACAINE LIPOSOME: HCPCS | Performed by: ORTHOPAEDIC SURGERY

## 2020-07-31 PROCEDURE — 77030040361 HC SLV COMPR DVT MDII -B: Performed by: ORTHOPAEDIC SURGERY

## 2020-07-31 PROCEDURE — 74011000250 HC RX REV CODE- 250: Performed by: ORTHOPAEDIC SURGERY

## 2020-07-31 PROCEDURE — 77030003445 HC NDL BIOP BN BD -B: Performed by: ORTHOPAEDIC SURGERY

## 2020-07-31 PROCEDURE — 76060000036 HC ANESTHESIA 2.5 TO 3 HR: Performed by: ORTHOPAEDIC SURGERY

## 2020-07-31 PROCEDURE — 76010000132 HC OR TIME 2.5 TO 3 HR: Performed by: ORTHOPAEDIC SURGERY

## 2020-07-31 PROCEDURE — 74011250636 HC RX REV CODE- 250/636: Performed by: ORTHOPAEDIC SURGERY

## 2020-07-31 PROCEDURE — 77030018316 HC SEAL FBRN TISSL 4ML BAXT -C: Performed by: ORTHOPAEDIC SURGERY

## 2020-07-31 PROCEDURE — 88304 TISSUE EXAM BY PATHOLOGIST: CPT

## 2020-07-31 PROCEDURE — 77030020131 HC STRAP ANK FT DISTR S&N -B: Performed by: ORTHOPAEDIC SURGERY

## 2020-07-31 PROCEDURE — 77030031139 HC SUT VCRL2 J&J -A: Performed by: ORTHOPAEDIC SURGERY

## 2020-07-31 PROCEDURE — 74011250636 HC RX REV CODE- 250/636: Performed by: SPECIALIST

## 2020-07-31 PROCEDURE — 77030006877 HC BLD SHV FLL RAD S&N -B: Performed by: ORTHOPAEDIC SURGERY

## 2020-07-31 PROCEDURE — 77030026434: Performed by: ORTHOPAEDIC SURGERY

## 2020-07-31 PROCEDURE — 77030022877 HC TU IRR ARTHRO PMP ARTH -B: Performed by: ORTHOPAEDIC SURGERY

## 2020-07-31 PROCEDURE — 77030020782 HC GWN BAIR PAWS FLX 3M -B: Performed by: ORTHOPAEDIC SURGERY

## 2020-07-31 PROCEDURE — 76210000063 HC OR PH I REC FIRST 0.5 HR: Performed by: ORTHOPAEDIC SURGERY

## 2020-07-31 DEVICE — IMPLANTABLE DEVICE: Type: IMPLANTABLE DEVICE | Site: ANKLE | Status: FUNCTIONAL

## 2020-07-31 RX ORDER — SODIUM CHLORIDE, SODIUM LACTATE, POTASSIUM CHLORIDE, CALCIUM CHLORIDE 600; 310; 30; 20 MG/100ML; MG/100ML; MG/100ML; MG/100ML
50 INJECTION, SOLUTION INTRAVENOUS CONTINUOUS
Status: CANCELLED | OUTPATIENT
Start: 2020-07-31

## 2020-07-31 RX ORDER — ONDANSETRON 2 MG/ML
4 INJECTION INTRAMUSCULAR; INTRAVENOUS ONCE
Status: CANCELLED | OUTPATIENT
Start: 2020-07-31 | End: 2020-07-31

## 2020-07-31 RX ORDER — FENTANYL CITRATE 50 UG/ML
25 INJECTION, SOLUTION INTRAMUSCULAR; INTRAVENOUS
Status: CANCELLED | OUTPATIENT
Start: 2020-07-31

## 2020-07-31 RX ORDER — DEXAMETHASONE SODIUM PHOSPHATE 4 MG/ML
INJECTION, SOLUTION INTRA-ARTICULAR; INTRALESIONAL; INTRAMUSCULAR; INTRAVENOUS; SOFT TISSUE AS NEEDED
Status: DISCONTINUED | OUTPATIENT
Start: 2020-07-31 | End: 2020-07-31 | Stop reason: HOSPADM

## 2020-07-31 RX ORDER — ONDANSETRON 4 MG/1
4 TABLET, ORALLY DISINTEGRATING ORAL
Qty: 20 TAB | Refills: 0 | Status: SHIPPED | OUTPATIENT
Start: 2020-07-31 | End: 2020-09-17 | Stop reason: CLARIF

## 2020-07-31 RX ORDER — KETAMINE HYDROCHLORIDE 10 MG/ML
INJECTION, SOLUTION INTRAMUSCULAR; INTRAVENOUS AS NEEDED
Status: DISCONTINUED | OUTPATIENT
Start: 2020-07-31 | End: 2020-07-31 | Stop reason: HOSPADM

## 2020-07-31 RX ORDER — SODIUM CHLORIDE, SODIUM LACTATE, POTASSIUM CHLORIDE, CALCIUM CHLORIDE 600; 310; 30; 20 MG/100ML; MG/100ML; MG/100ML; MG/100ML
125 INJECTION, SOLUTION INTRAVENOUS CONTINUOUS
Status: DISCONTINUED | OUTPATIENT
Start: 2020-07-31 | End: 2020-07-31 | Stop reason: HOSPADM

## 2020-07-31 RX ORDER — MIDAZOLAM HYDROCHLORIDE 1 MG/ML
INJECTION, SOLUTION INTRAMUSCULAR; INTRAVENOUS AS NEEDED
Status: DISCONTINUED | OUTPATIENT
Start: 2020-07-31 | End: 2020-07-31 | Stop reason: HOSPADM

## 2020-07-31 RX ORDER — ONDANSETRON 2 MG/ML
INJECTION INTRAMUSCULAR; INTRAVENOUS AS NEEDED
Status: DISCONTINUED | OUTPATIENT
Start: 2020-07-31 | End: 2020-07-31 | Stop reason: HOSPADM

## 2020-07-31 RX ORDER — LIDOCAINE HYDROCHLORIDE 20 MG/ML
INJECTION, SOLUTION EPIDURAL; INFILTRATION; INTRACAUDAL; PERINEURAL AS NEEDED
Status: DISCONTINUED | OUTPATIENT
Start: 2020-07-31 | End: 2020-07-31 | Stop reason: HOSPADM

## 2020-07-31 RX ORDER — GLYCOPYRROLATE 0.2 MG/ML
INJECTION INTRAMUSCULAR; INTRAVENOUS AS NEEDED
Status: DISCONTINUED | OUTPATIENT
Start: 2020-07-31 | End: 2020-07-31 | Stop reason: HOSPADM

## 2020-07-31 RX ORDER — SODIUM CHLORIDE, SODIUM LACTATE, POTASSIUM CHLORIDE, CALCIUM CHLORIDE 600; 310; 30; 20 MG/100ML; MG/100ML; MG/100ML; MG/100ML
INJECTION, SOLUTION INTRAVENOUS
Status: DISCONTINUED | OUTPATIENT
Start: 2020-07-31 | End: 2020-07-31 | Stop reason: HOSPADM

## 2020-07-31 RX ORDER — NALOXONE HYDROCHLORIDE 0.4 MG/ML
0.1 INJECTION, SOLUTION INTRAMUSCULAR; INTRAVENOUS; SUBCUTANEOUS
Status: CANCELLED | OUTPATIENT
Start: 2020-07-31

## 2020-07-31 RX ORDER — OXYCODONE AND ACETAMINOPHEN 5; 325 MG/1; MG/1
1 TABLET ORAL AS NEEDED
Status: CANCELLED | OUTPATIENT
Start: 2020-07-31

## 2020-07-31 RX ORDER — HYDROMORPHONE HYDROCHLORIDE 2 MG/ML
0.5 INJECTION, SOLUTION INTRAMUSCULAR; INTRAVENOUS; SUBCUTANEOUS
Status: CANCELLED | OUTPATIENT
Start: 2020-07-31

## 2020-07-31 RX ORDER — OXYCODONE AND ACETAMINOPHEN 5; 325 MG/1; MG/1
1-2 TABLET ORAL
Qty: 40 TAB | Refills: 0 | Status: SHIPPED | OUTPATIENT
Start: 2020-07-31 | End: 2020-08-03

## 2020-07-31 RX ORDER — CLINDAMYCIN PHOSPHATE 900 MG/50ML
900 INJECTION INTRAVENOUS ONCE
Status: COMPLETED | OUTPATIENT
Start: 2020-07-31 | End: 2020-07-31

## 2020-07-31 RX ORDER — FENTANYL CITRATE 50 UG/ML
INJECTION, SOLUTION INTRAMUSCULAR; INTRAVENOUS AS NEEDED
Status: DISCONTINUED | OUTPATIENT
Start: 2020-07-31 | End: 2020-07-31 | Stop reason: HOSPADM

## 2020-07-31 RX ORDER — PROPOFOL 10 MG/ML
INJECTION, EMULSION INTRAVENOUS AS NEEDED
Status: DISCONTINUED | OUTPATIENT
Start: 2020-07-31 | End: 2020-07-31 | Stop reason: HOSPADM

## 2020-07-31 RX ORDER — HYDROMORPHONE HYDROCHLORIDE 2 MG/ML
INJECTION, SOLUTION INTRAMUSCULAR; INTRAVENOUS; SUBCUTANEOUS AS NEEDED
Status: DISCONTINUED | OUTPATIENT
Start: 2020-07-31 | End: 2020-07-31 | Stop reason: HOSPADM

## 2020-07-31 RX ORDER — NALOXONE HYDROCHLORIDE 4 MG/.1ML
SPRAY NASAL
Qty: 1 EACH | Refills: 0 | Status: SHIPPED | OUTPATIENT
Start: 2020-07-31

## 2020-07-31 RX ADMIN — LIDOCAINE HYDROCHLORIDE 60 MG: 20 INJECTION, SOLUTION EPIDURAL; INFILTRATION; INTRACAUDAL; PERINEURAL at 07:38

## 2020-07-31 RX ADMIN — KETAMINE HYDROCHLORIDE 10 MG: 10 INJECTION, SOLUTION INTRAMUSCULAR; INTRAVENOUS at 08:22

## 2020-07-31 RX ADMIN — SODIUM CHLORIDE, SODIUM LACTATE, POTASSIUM CHLORIDE, AND CALCIUM CHLORIDE 125 ML/HR: 600; 310; 30; 20 INJECTION, SOLUTION INTRAVENOUS at 06:40

## 2020-07-31 RX ADMIN — KETAMINE HYDROCHLORIDE 10 MG: 10 INJECTION, SOLUTION INTRAMUSCULAR; INTRAVENOUS at 07:52

## 2020-07-31 RX ADMIN — GLYCOPYRROLATE 0.2 MG: 0.2 INJECTION INTRAMUSCULAR; INTRAVENOUS at 07:30

## 2020-07-31 RX ADMIN — ONDANSETRON HYDROCHLORIDE 4 MG: 2 INJECTION INTRAMUSCULAR; INTRAVENOUS at 07:51

## 2020-07-31 RX ADMIN — FENTANYL CITRATE 25 MCG: 50 INJECTION, SOLUTION INTRAMUSCULAR; INTRAVENOUS at 07:38

## 2020-07-31 RX ADMIN — FENTANYL CITRATE 25 MCG: 50 INJECTION, SOLUTION INTRAMUSCULAR; INTRAVENOUS at 08:57

## 2020-07-31 RX ADMIN — HYDROMORPHONE HYDROCHLORIDE 0.5 MG: 2 INJECTION, SOLUTION INTRAMUSCULAR; INTRAVENOUS; SUBCUTANEOUS at 09:10

## 2020-07-31 RX ADMIN — KETAMINE HYDROCHLORIDE 20 MG: 10 INJECTION, SOLUTION INTRAMUSCULAR; INTRAVENOUS at 07:38

## 2020-07-31 RX ADMIN — KETAMINE HYDROCHLORIDE 10 MG: 10 INJECTION, SOLUTION INTRAMUSCULAR; INTRAVENOUS at 08:03

## 2020-07-31 RX ADMIN — CLINDAMYCIN PHOSPHATE 900 MG: 900 INJECTION, SOLUTION INTRAVENOUS at 07:30

## 2020-07-31 RX ADMIN — DEXAMETHASONE SODIUM PHOSPHATE 4 MG: 4 INJECTION, SOLUTION INTRAMUSCULAR; INTRAVENOUS at 07:51

## 2020-07-31 RX ADMIN — SODIUM CHLORIDE, SODIUM LACTATE, POTASSIUM CHLORIDE, AND CALCIUM CHLORIDE: 600; 310; 30; 20 INJECTION, SOLUTION INTRAVENOUS at 07:30

## 2020-07-31 RX ADMIN — PROPOFOL 180 MG: 10 INJECTION, EMULSION INTRAVENOUS at 07:38

## 2020-07-31 RX ADMIN — FENTANYL CITRATE 25 MCG: 50 INJECTION, SOLUTION INTRAMUSCULAR; INTRAVENOUS at 08:54

## 2020-07-31 RX ADMIN — HYDROMORPHONE HYDROCHLORIDE 0.5 MG: 2 INJECTION, SOLUTION INTRAMUSCULAR; INTRAVENOUS; SUBCUTANEOUS at 09:29

## 2020-07-31 RX ADMIN — MIDAZOLAM 2 MG: 1 INJECTION INTRAMUSCULAR; INTRAVENOUS at 07:30

## 2020-07-31 RX ADMIN — SODIUM CHLORIDE, SODIUM LACTATE, POTASSIUM CHLORIDE, AND CALCIUM CHLORIDE: 600; 310; 30; 20 INJECTION, SOLUTION INTRAVENOUS at 08:30

## 2020-07-31 RX ADMIN — FENTANYL CITRATE 25 MCG: 50 INJECTION, SOLUTION INTRAMUSCULAR; INTRAVENOUS at 07:56

## 2020-07-31 NOTE — ANESTHESIA POSTPROCEDURE EVALUATION
Post-Anesthesia Evaluation and Assessment    Cardiovascular Function/Vital Signs  Visit Vitals  /79   Pulse 99   Temp 36.6 °C (97.8 °F)   Resp 11   Ht 5' 3\" (1.6 m)   Wt 100.4 kg (221 lb 4 oz)   SpO2 95%   BMI 39.19 kg/m²       Patient is status post Procedure(s):  RIGHT ANKLE SCOPE, DENOVO OF OSTEOCHONDRAL LESION GRAFTING, TARSAL TUNNEL RELEASE, REMOVE SUBTALAR GANGLION, EXAMINATION UNDER ANESTHESIA , RIGHT FOOT NEUROMA REMOVAL  WITH MINI C-ARM  **SPEC POP**. Nausea/Vomiting: Controlled. Postoperative hydration reviewed and adequate. Pain:  Pain Scale 1: Numeric (0 - 10) (07/31/20 1020)  Pain Intensity 1: 0 (07/31/20 1020)   Managed. Neurological Status:   Neuro (WDL): Within Defined Limits (07/31/20 0629)   At baseline. Mental Status and Level of Consciousness: Baseline and appropriate for discharge. Pulmonary Status:   O2 Device: Nasal cannula (07/31/20 1020)   Adequate oxygenation and airway patent. Complications related to anesthesia: None    Post-anesthesia assessment completed. No concerns. Patient has met all discharge requirements.     Signed By: Neto Vicente MD    July 31, 2020

## 2020-07-31 NOTE — BRIEF OP NOTE
Brief Postoperative Note    Patient: Cleveland Jarvis  YOB: 1960  MRN: [id number]    Date of Procedure: 7/31/2020     Pre-Op Diagnosis: RIGHT ANKLE INSTABILITY, OSTEOCHONDRAL FRACTURE TALUS, TARSAL TUNNEL    Post-Op Diagnosis: Same as preoperative diagnosis. Procedure(s):  RIGHT ANKLE SCOPE, DENOVO OF OSTEOCHONDRAL LESION GRAFTING, TARSAL TUNNEL RELEASE, REMOVE SUBTALAR GANGLION, EXAMINATION UNDER ANESTHESIA , RIGHT FOOT NEUROMA REMOVAL  WITH MINI C-ARM  **SPEC POP**    Surgeon(s):  Toni Hand MD    Surgical Assistant: Katie Alonso    Anesthesia: General     Estimated Blood Loss (mL): less than 944     Complications: None    Specimens:   ID Type Source Tests Collected by Time Destination   1 : neuroma right foot Preservative Foot, Right  Toni Hand MD 7/31/2020 6478 Pathology        Implants:   Implant Name Type Inv.  Item Serial No.  Lot No. LRB No. Used Action   GRAFT BNE JUV TISS CARTILAGE -- DENOVO NT - [id number]  GRAFT BNE JUV TISS CARTILAGE -- DENOVO NT  Chun Freeman [id number] Right 1 Implanted       Drains: * No LDAs found *    Findings: tight tarsal tunnel, large OLT    Electronically Signed by Estefany Velasquez MD on 7/31/2020 at 10:11 AM

## 2020-07-31 NOTE — OP NOTES
[facility] [place]  OPERATIVE REPORT    Name:  Shawn Phipps  MR#:   [id number]  :  1960  ACCOUNT #:  [de-identified]  DATE OF SERVICE:  2020    PREOPERATIVE DIAGNOSES:  Right ankle instability, large osteochondral lesion of the talus, tarsal tunnel syndrome, and Alexis's neuroma. POSTOPERATIVE DIAGNOSES:  Osteochondral lesion of the talus tarsal tunnel, flexor hallucis longus tenosynovitis, ganglion of the subtalar joint, no ankle instability. PROCEDURES PERFORMED:  Right ankle arthroscopic debridement with removal of microfracture with grafting of the lesion, tarsal tunnel release, removal of subtalar ganglion, Alexis's neuroma removal.    SURGEON:  Karishma Alonso MD    ASSISTANT:  Bettie Zaman. ANESTHESIA:  General.    COMPLICATIONS:  none    SPECIMENS REMOVED:  Neuroma. IMPLANTS:  Isaiah juvenile graft DeNovo, log number [id number], lot number [id number]. ESTIMATED BLOOD LOSS:  Less than 100. INDICATION:  The patient has had a longstanding feeling of instability of her ankle with mechanical locking and catching which has also caused burning, tingling, and pain in her foot. She has had surgery on her fourth and fifth toes in the past.  She has seen other providers who treated her conservatively, but with her persistent symptoms, she felt that this was affecting her activities of daily living, difficulty walking, difficulty with standing on uneven surfaces, and complaining of persistent right ankle pain. We discussed her complicated medical history including Tammi-Danlos, fibromyalgia, multiple drug allergies, burning pain, history of blood clots, Crohn's disease. We went through all this, discussed the risks and benefits. After getting medically cleared, she wished to proceed. She was COVID tested. She was marked. PROCEDURE:  She was taken to the operating room. She underwent general anesthesia supine on the operating room table, padded appropriately.   The time-out showed the correct limb and correct patient. After reviewing her x-rays, time-out, and indications, she was supine on the operating table. Leg was placed on a bump foam and placed in a [place] leg faulkner and prepped and draped. We then used a mini C-arm and did examination under anesthesia. The mini C-arm found that her anterior drawer was about 4-5 mm and the talar tilt was 1 degree. We felt this was not significant enough to consider a Brostrom. After this, we then addressed the ankle scope. We used a two-portal arthroscopy using the 2.7 scope. The arthroscopy showed there to be a significant amount of scar tissue in the anterior aspect of the joint. We debrided the medial and lateral gutters and debrided the anterior tibial osteophyte and synovitis. After this was debrided, we distracted the joint and found there to be multiple loose fragments of cartilage as well as a bone and cartilage fragment. These were removed using end-biting basket. We then used a curette to curette down to stable edges. We removed the debris. We could see the lateral joint clearly, and there were no signs of remaining loose body. We then used a drill to perforate into the subchondral bone and then let the scope go dry. We cleaned this out, washed it, and then dried using pledgets as well as suction. After this, we placed Tisseel across the bottom and then placed the DeNovo graft which was placed into a 1.9 cannula. This was passed through and dripped into this. After this was smoothed across the entire area of the bed, we then added more Tisseel and allowed about 10 minutes for this to harden. While this was hardening, we then reviewed the timeout and made a dorsal incision using her previous third toe incision sharply down through skin. We freed this up and dissected down. We freed up the tendons and identified the transverse metatarsal ligament.   This was cut and then the Alexis's neuroma, thickened area of the nerve was identified. There was no definite scar tissue, no signs of significant instability of the third toe; therefore, we took the nerve, injected it with Marcaine, and cut this, and was handed off as specimen. After we did this, we washed this out. We took the leg out of the [place] leg faulkner, externally rotated, and then made a curved incision surrounding over the tarsal tunnel sharply down through skin curving down the plantar aspect of the foot. She had significant varicose veins. After debriding, transversing veins were cauterized. We opened up the fascia over the tarsal tunnel. After we freed up this fascia, we then found there to be very dilated veins. We were using the tourniquet during this time, which was inflated to 300 mmHg at the right thigh. With the dilated veins pushed back, we cut the fascia above and below. We went down to the abductor hallucis and made a small window in the abductor hallucis and placed an [employer] and then identified a Free elevator underneath and then cut the fascia on the lateral aspect of the abductor hallucis until we had the nerves going under the foot appeared to be decompressed. We then retracted the vessels posteriorly and then opened up the posterior tibial tendon sheath and then identified the tendon. This did not appear to be torn. We then opened up the FHL sheath, found there to be a significant amount of fluid with a ganglion cyst which we saw on the MRI was coming off the medial aspect of the subtalar joint. This was debrided. This did have some fluid from the joint, but no significant mass pushing on the tendon. After freeing up the FHL tendon, we washed this out with normal saline with Betadine. We dropped the tourniquet at 23 minutes. EBL was then about 100 during the whole case. We closed the skin and fascia on the medial side with Vicryl. We then closed the portals with nylon. We then closed the dorsal incision with Vicryl and nylon. We closed the medial incision with nylon. We injected the ankle joint with Naropin, Exparel, and morphine. We injected along the incision with Marcaine and Exparel. She had good alignment of the foot, minimal drainage. Soft dressing was applied and three-sided bulky North splint. She was extubated and transferred to Recovery Room. She was discharged to home. She will be nonweightbearing. Due to her history of DVT, she will be placed on Eliquis postoperatively. She then will be nonweightbearing with the bulky North splint. Pain medications and antinausea medicines were prescribed. She will follow up in the office in about a week. Early dressing change, early range of motion, nonweightbearing about six weeks.       Domitila Us MD      [id number]  D:  07/31/2020 10:42  T:  07/31/2020 15:16  JOB #:  [id number]

## 2020-07-31 NOTE — INTERVAL H&P NOTE
Update History & Physical 
 
The Patient's History and Physical of was reviewed with the patient and I examined the patient. There was no change, we did discuss possible 3rd toe neuroma resection. The surgical site was confirmed by the patient and me. Plan:  The risk, benefits, expected outcome, and alternative to the recommended procedure have been discussed with the patient. Patient understands and wants to proceed with the procedure.  
 
Electronically signed by Shaylee Cagle MD on 7/31/2020 at 7:24 AM

## 2020-07-31 NOTE — PERIOP NOTES
Reviewed PTA medication list with patient/caregiver and patient/caregiver denies any additional medications. Patient admits to having a responsible adult care for them at home for at least 24 hours after surgery. Patient encouraged to use gown warming system and informed that using said warming gown to regulate body temperature prior to a procedure has been shown to help reduce the risks of blood clots and infection. Dual skin assessment & fall risk band verification completed with Felipa Fung RN.

## 2020-07-31 NOTE — DISCHARGE INSTRUCTIONS
Patient Education        9 Things To Do If You've Been Exposed to COVID-19    Stay home. If you've been exposed, you should stay in isolation for 14 days. Don't go to school, work, or public areas. And don't use public transportation, ride-shares, or taxis unless you have no choice. Leave your home only if you need to get medical care. But call the doctor's office first so they know you're coming, and wear a cloth face cover when you go. Call your doctor. Call your doctor or other health professional to let them know that you've been exposed. They might want you to be tested, or they may have other instructions for you. If you become sick, wear a face cover when you are around other people. It can help stop the spread of the virus when you cough or sneeze. Limit contact with people in your home. If possible, stay in a separate bedroom and use a separate bathroom. Avoid contact with pets and other animals. Cover your mouth and nose with a tissue when you cough or sneeze. Then throw it in the trash right away. Wash your hands often, especially after you cough or sneeze. Use soap and water, and scrub for at least 20 seconds. If soap and water aren't available, use an alcohol-based hand . Don't share personal household items. These include bedding, towels, cups and glasses, and eating utensils. Clean and disinfect your home every day. Use household  or disinfectant wipes or sprays. Take special care to clean things that you grab with your hands. These include doorknobs, remote controls, phones, and handles on your refrigerator and microwave. And don't forget countertops, tabletops, bathrooms, and computer keyboards. Current as of: May 8, 2020               Content Version: 12.5  © 2006-2020 [employer]. Care instructions adapted under license by [employer] (which disclaims liability or warranty for this information).  If you have questions about a medical condition or this instruction, always ask your healthcare professional. [address] any warranty or liability for your use of this information. DISCHARGE SUMMARY from Nurse    PATIENT INSTRUCTIONS:    After general anesthesia or intravenous sedation, for 24 hours or while taking prescription Narcotics:  · Limit your activities  · Do not drive and operate hazardous machinery  · Do not make important personal or business decisions  · Do  not drink alcoholic beverages  · If you have not urinated within 8 hours after discharge, please contact your surgeon on call. Report the following to your surgeon:  · Excessive pain, swelling, redness or odor of or around the surgical area  · Temperature over 100.5  · Nausea and vomiting lasting longer than 4 hours or if unable to take medications  · Any signs of decreased circulation or nerve impairment to extremity: change in color, persistent  numbness, tingling, coldness or increase pain  · Any questions    What to do at Home:  [address] IN 10 DAYS CALL FOR APPT    If you experience any of the following symptoms heavy bleeding, fevers, severe pain, circulation changes, please follow up with dr Funmilayo Clay    *  Please give a list of your current medications to your Primary Care Provider. *  Please update this list whenever your medications are discontinued, doses are      changed, or new medications (including over-the-counter products) are added. *  Please carry medication information at all times in case of emergency situations. These are general instructions for a healthy lifestyle:    No smoking/ No tobacco products/ Avoid exposure to second hand smoke  Surgeon General's Warning:  Quitting smoking now greatly reduces serious risk to your health.     Obesity, smoking, and sedentary lifestyle greatly increases your risk for illness    A healthy diet, regular physical exercise & weight monitoring are important for maintaining a healthy lifestyle    You may be retaining fluid if you have a history of heart failure or if you experience any of the following symptoms:  Weight gain of 3 pounds or more overnight or 5 pounds in a week, increased swelling in our hands or feet or shortness of breath while lying flat in bed. Please call your doctor as soon as you notice any of these symptoms; do not wait until your next office visit. The discharge information has been reviewed with the patient and caregiver. The patient and caregiver verbalized understanding. Discharge medications reviewed with the patient and caregiver and appropriate educational materials and side effects teaching were provided.   ___________________________________________________________________________________________________________________________________    Patient armband removed and shredded

## 2020-07-31 NOTE — ANESTHESIA PREPROCEDURE EVALUATION
Relevant Problems   No relevant active problems       Anesthetic History     PONV          Review of Systems / Medical History  Patient summary reviewed, nursing notes reviewed and pertinent labs reviewed    Pulmonary            Asthma (exercise induced only - no inhaler use in months)        Neuro/Psych   Within defined limits           Cardiovascular            Dysrhythmias (WPW S/P ablation - much better)     Pertinent negatives: No hypertension (fluid retention)  Exercise tolerance: >4 METS     GI/Hepatic/Renal     GERD           Endo/Other      Hypothyroidism  Arthritis (rheumatoid)     Other Findings              Physical Exam    Airway  Mallampati: II  TM Distance: 4 - 6 cm  Neck ROM: normal range of motion   Mouth opening: Normal     Cardiovascular               Dental    Dentition: Caps/crowns and Bridges     Pulmonary                 Abdominal         Other Findings            Anesthetic Plan    ASA: 3  Anesthesia type: general          Induction: Intravenous  Anesthetic plan and risks discussed with: Patient

## 2020-09-17 ENCOUNTER — ANESTHESIA EVENT (OUTPATIENT)
Dept: SURGERY | Age: 60
End: 2020-09-17
Payer: COMMERCIAL

## 2020-09-17 ENCOUNTER — HOSPITAL ENCOUNTER (OUTPATIENT)
Dept: PREADMISSION TESTING | Age: 60
Discharge: HOME | End: 2020-09-17
Payer: COMMERCIAL

## 2020-09-17 ENCOUNTER — HOSPITAL ENCOUNTER (OUTPATIENT)
Dept: LAB | Age: 60
Discharge: HOME | End: 2020-09-17
Payer: COMMERCIAL

## 2020-09-17 ENCOUNTER — HOSPITAL ENCOUNTER (OUTPATIENT)
Dept: NON INVASIVE DIAGNOSTICS | Age: 60
Discharge: HOME | End: 2020-09-17
Payer: COMMERCIAL

## 2020-09-17 LAB
ALBUMIN SERPL-MCNC: 4 G/DL (ref 3.4–5)
ALBUMIN/GLOB SERPL: 1.1 {RATIO} (ref 0.8–1.7)
ALP SERPL-CCNC: 102 U/L (ref 45–117)
ALT SERPL-CCNC: 26 U/L (ref 13–56)
ANION GAP SERPL CALC-SCNC: 7 MMOL/L (ref 3–18)
AST SERPL-CCNC: 25 U/L (ref 10–38)
BILIRUB SERPL-MCNC: 0.6 MG/DL (ref 0.2–1)
BUN SERPL-MCNC: 18 MG/DL (ref 7–18)
BUN/CREAT SERPL: 21 (ref 12–20)
CALCIUM SERPL-MCNC: 9.3 MG/DL (ref 8.5–10.1)
CHLORIDE SERPL-SCNC: 102 MMOL/L (ref 100–111)
CO2 SERPL-SCNC: 30 MMOL/L (ref 21–32)
CREAT SERPL-MCNC: 0.85 MG/DL (ref 0.6–1.3)
ERYTHROCYTE [DISTWIDTH] IN BLOOD BY AUTOMATED COUNT: 15.1 % (ref 11.6–14.5)
GLOBULIN SER CALC-MCNC: 3.7 G/DL (ref 2–4)
GLUCOSE SERPL-MCNC: 84 MG/DL (ref 74–99)
HCT VFR BLD AUTO: 39.4 % (ref 35–45)
HGB BLD-MCNC: 13 G/DL (ref 12–16)
MCH RBC QN AUTO: 30.2 PG (ref 24–34)
MCHC RBC AUTO-ENTMCNC: 33 G/DL (ref 31–37)
MCV RBC AUTO: 91.4 FL (ref 74–97)
PLATELET # BLD AUTO: 200 K/UL (ref 135–420)
PMV BLD AUTO: 9.6 FL (ref 9.2–11.8)
POTASSIUM SERPL-SCNC: 3.3 MMOL/L (ref 3.5–5.5)
PROT SERPL-MCNC: 7.7 G/DL (ref 6.4–8.2)
RBC # BLD AUTO: 4.31 M/UL (ref 4.2–5.3)
SODIUM SERPL-SCNC: 139 MMOL/L (ref 136–145)
WBC # BLD AUTO: 7.7 K/UL (ref 4.6–13.2)

## 2020-09-17 PROCEDURE — 93005 ELECTROCARDIOGRAM TRACING: CPT

## 2020-09-17 PROCEDURE — 86140 C-REACTIVE PROTEIN: CPT

## 2020-09-17 PROCEDURE — 36415 COLL VENOUS BLD VENIPUNCTURE: CPT

## 2020-09-17 PROCEDURE — 80053 COMPREHEN METABOLIC PANEL: CPT

## 2020-09-17 PROCEDURE — 85027 COMPLETE CBC AUTOMATED: CPT

## 2020-09-17 PROCEDURE — 87635 SARS-COV-2 COVID-19 AMP PRB: CPT

## 2020-09-18 ENCOUNTER — APPOINTMENT (OUTPATIENT)
Dept: INTERVENTIONAL RADIOLOGY/VASCULAR | Age: 60
End: 2020-09-18
Attending: INTERNAL MEDICINE
Payer: COMMERCIAL

## 2020-09-18 ENCOUNTER — HOSPITAL ENCOUNTER (OUTPATIENT)
Age: 60
Setting detail: OBSERVATION
Discharge: HOME HEALTH | End: 2020-09-19
Attending: ORTHOPAEDIC SURGERY | Admitting: ORTHOPAEDIC SURGERY
Payer: COMMERCIAL

## 2020-09-18 ENCOUNTER — ANESTHESIA (OUTPATIENT)
Dept: SURGERY | Age: 60
End: 2020-09-18
Payer: COMMERCIAL

## 2020-09-18 DIAGNOSIS — T81.49XA INCISIONAL ABSCESS: Primary | ICD-10-CM

## 2020-09-18 LAB
ATRIAL RATE: 78 BPM
CALCULATED P AXIS, ECG09: 57 DEGREES
CALCULATED R AXIS, ECG10: 5 DEGREES
CALCULATED T AXIS, ECG11: 36 DEGREES
CRP SERPL-MCNC: 1.3 MG/DL (ref 0–0.3)
DIAGNOSIS, 93000: NORMAL
P-R INTERVAL, ECG05: 174 MS
Q-T INTERVAL, ECG07: 426 MS
QRS DURATION, ECG06: 98 MS
QTC CALCULATION (BEZET), ECG08: 485 MS
SARS-COV-2, COV2NT: NOT DETECTED
VENTRICULAR RATE, ECG03: 78 BPM

## 2020-09-18 PROCEDURE — 77030040361 HC SLV COMPR DVT MDII -B: Performed by: ORTHOPAEDIC SURGERY

## 2020-09-18 PROCEDURE — 76060000032 HC ANESTHESIA 0.5 TO 1 HR: Performed by: ORTHOPAEDIC SURGERY

## 2020-09-18 PROCEDURE — 87075 CULTR BACTERIA EXCEPT BLOOD: CPT

## 2020-09-18 PROCEDURE — 74011000250 HC RX REV CODE- 250: Performed by: ANESTHESIOLOGY

## 2020-09-18 PROCEDURE — 74011250636 HC RX REV CODE- 250/636

## 2020-09-18 PROCEDURE — 74011000250 HC RX REV CODE- 250: Performed by: RADIOLOGY

## 2020-09-18 PROCEDURE — 87077 CULTURE AEROBIC IDENTIFY: CPT

## 2020-09-18 PROCEDURE — 74011250636 HC RX REV CODE- 250/636: Performed by: ORTHOPAEDIC SURGERY

## 2020-09-18 PROCEDURE — 74011000258 HC RX REV CODE- 258: Performed by: ORTHOPAEDIC SURGERY

## 2020-09-18 PROCEDURE — 74011250636 HC RX REV CODE- 250/636: Performed by: INTERNAL MEDICINE

## 2020-09-18 PROCEDURE — 74011250636 HC RX REV CODE- 250/636: Performed by: ANESTHESIOLOGY

## 2020-09-18 PROCEDURE — 77030020782 HC GWN BAIR PAWS FLX 3M -B: Performed by: ORTHOPAEDIC SURGERY

## 2020-09-18 PROCEDURE — 74011250637 HC RX REV CODE- 250/637: Performed by: ORTHOPAEDIC SURGERY

## 2020-09-18 PROCEDURE — 2709999900 HC NON-CHARGEABLE SUPPLY: Performed by: ORTHOPAEDIC SURGERY

## 2020-09-18 PROCEDURE — 77030012508 HC MSK AIRWY LMA AMBU -A: Performed by: ANESTHESIOLOGY

## 2020-09-18 PROCEDURE — 77030002916 HC SUT ETHLN J&J -A: Performed by: ORTHOPAEDIC SURGERY

## 2020-09-18 PROCEDURE — C1751 CATH, INF, PER/CENT/MIDLINE: HCPCS

## 2020-09-18 PROCEDURE — 76010000138 HC OR TIME 0.5 TO 1 HR: Performed by: ORTHOPAEDIC SURGERY

## 2020-09-18 PROCEDURE — 65270000029 HC RM PRIVATE

## 2020-09-18 PROCEDURE — 76210000016 HC OR PH I REC 1 TO 1.5 HR: Performed by: ORTHOPAEDIC SURGERY

## 2020-09-18 PROCEDURE — 87205 SMEAR GRAM STAIN: CPT

## 2020-09-18 PROCEDURE — 97116 GAIT TRAINING THERAPY: CPT

## 2020-09-18 PROCEDURE — 77030000032 HC CUF TRNQT ZIMM -B: Performed by: ORTHOPAEDIC SURGERY

## 2020-09-18 PROCEDURE — 87186 SC STD MICRODIL/AGAR DIL: CPT

## 2020-09-18 PROCEDURE — 97162 PT EVAL MOD COMPLEX 30 MIN: CPT

## 2020-09-18 PROCEDURE — 74011000250 HC RX REV CODE- 250: Performed by: ORTHOPAEDIC SURGERY

## 2020-09-18 RX ORDER — VITAMIN E 268 MG
400 CAPSULE ORAL DAILY
Status: DISCONTINUED | OUTPATIENT
Start: 2020-09-19 | End: 2020-09-20 | Stop reason: HOSPADM

## 2020-09-18 RX ORDER — KETAMINE HYDROCHLORIDE 10 MG/ML
INJECTION, SOLUTION INTRAMUSCULAR; INTRAVENOUS AS NEEDED
Status: DISCONTINUED | OUTPATIENT
Start: 2020-09-18 | End: 2020-09-18 | Stop reason: HOSPADM

## 2020-09-18 RX ORDER — HYDROMORPHONE HYDROCHLORIDE 1 MG/ML
1 INJECTION, SOLUTION INTRAMUSCULAR; INTRAVENOUS; SUBCUTANEOUS
Status: DISCONTINUED | OUTPATIENT
Start: 2020-09-18 | End: 2020-09-20 | Stop reason: HOSPADM

## 2020-09-18 RX ORDER — DICLOFENAC SODIUM 10 MG/G
2 GEL TOPICAL
Status: DISCONTINUED | OUTPATIENT
Start: 2020-09-18 | End: 2020-09-20 | Stop reason: HOSPADM

## 2020-09-18 RX ORDER — HEPARIN SODIUM (PORCINE) LOCK FLUSH IV SOLN 100 UNIT/ML 100 UNIT/ML
500 SOLUTION INTRAVENOUS
Status: DISCONTINUED | OUTPATIENT
Start: 2020-09-18 | End: 2020-09-20 | Stop reason: HOSPADM

## 2020-09-18 RX ORDER — LIDOCAINE HYDROCHLORIDE 20 MG/ML
INJECTION, SOLUTION EPIDURAL; INFILTRATION; INTRACAUDAL; PERINEURAL AS NEEDED
Status: DISCONTINUED | OUTPATIENT
Start: 2020-09-18 | End: 2020-09-18 | Stop reason: HOSPADM

## 2020-09-18 RX ORDER — DEXAMETHASONE SODIUM PHOSPHATE 4 MG/ML
INJECTION, SOLUTION INTRA-ARTICULAR; INTRALESIONAL; INTRAMUSCULAR; INTRAVENOUS; SOFT TISSUE AS NEEDED
Status: DISCONTINUED | OUTPATIENT
Start: 2020-09-18 | End: 2020-09-18 | Stop reason: HOSPADM

## 2020-09-18 RX ORDER — ALBUTEROL SULFATE 0.83 MG/ML
2.5 SOLUTION RESPIRATORY (INHALATION)
Status: DISCONTINUED | OUTPATIENT
Start: 2020-09-18 | End: 2020-09-20 | Stop reason: HOSPADM

## 2020-09-18 RX ORDER — HYDROXYZINE 25 MG/1
25 TABLET, FILM COATED ORAL
Status: DISCONTINUED | OUTPATIENT
Start: 2020-09-18 | End: 2020-09-20 | Stop reason: HOSPADM

## 2020-09-18 RX ORDER — NALOXONE HYDROCHLORIDE 0.4 MG/ML
0.1 INJECTION, SOLUTION INTRAMUSCULAR; INTRAVENOUS; SUBCUTANEOUS
Status: DISCONTINUED | OUTPATIENT
Start: 2020-09-18 | End: 2020-09-20 | Stop reason: HOSPADM

## 2020-09-18 RX ORDER — LEVOTHYROXINE SODIUM 75 UG/1
112 TABLET ORAL
Status: DISCONTINUED | OUTPATIENT
Start: 2020-09-19 | End: 2020-09-20 | Stop reason: HOSPADM

## 2020-09-18 RX ORDER — FENTANYL CITRATE 50 UG/ML
25 INJECTION, SOLUTION INTRAMUSCULAR; INTRAVENOUS AS NEEDED
Status: DISCONTINUED | OUTPATIENT
Start: 2020-09-18 | End: 2020-09-18 | Stop reason: HOSPADM

## 2020-09-18 RX ORDER — BUPIVACAINE HYDROCHLORIDE 5 MG/ML
INJECTION, SOLUTION EPIDURAL; INTRACAUDAL AS NEEDED
Status: DISCONTINUED | OUTPATIENT
Start: 2020-09-18 | End: 2020-09-18 | Stop reason: HOSPADM

## 2020-09-18 RX ORDER — HEPARIN SODIUM 200 [USP'U]/100ML
500 INJECTION, SOLUTION INTRAVENOUS
Status: COMPLETED | OUTPATIENT
Start: 2020-09-18 | End: 2020-09-18

## 2020-09-18 RX ORDER — LEVOCETIRIZINE DIHYDROCHLORIDE 5 MG/1
5 TABLET, FILM COATED ORAL
Status: DISCONTINUED | OUTPATIENT
Start: 2020-09-18 | End: 2020-09-20 | Stop reason: HOSPADM

## 2020-09-18 RX ORDER — METHOTREXATE 2.5 MG/1
20 TABLET ORAL
Status: DISCONTINUED | OUTPATIENT
Start: 2020-09-18 | End: 2020-09-20 | Stop reason: HOSPADM

## 2020-09-18 RX ORDER — HEPARIN SODIUM 200 [USP'U]/100ML
INJECTION, SOLUTION INTRAVENOUS
Status: COMPLETED
Start: 2020-09-18 | End: 2020-09-18

## 2020-09-18 RX ORDER — GLYCOPYRROLATE 0.2 MG/ML
INJECTION INTRAMUSCULAR; INTRAVENOUS AS NEEDED
Status: DISCONTINUED | OUTPATIENT
Start: 2020-09-18 | End: 2020-09-18 | Stop reason: HOSPADM

## 2020-09-18 RX ORDER — FLUMAZENIL 0.1 MG/ML
0.2 INJECTION INTRAVENOUS
Status: DISCONTINUED | OUTPATIENT
Start: 2020-09-18 | End: 2020-09-18 | Stop reason: HOSPADM

## 2020-09-18 RX ORDER — NALOXONE HYDROCHLORIDE 0.4 MG/ML
0.1 INJECTION, SOLUTION INTRAMUSCULAR; INTRAVENOUS; SUBCUTANEOUS AS NEEDED
Status: DISCONTINUED | OUTPATIENT
Start: 2020-09-18 | End: 2020-09-18 | Stop reason: HOSPADM

## 2020-09-18 RX ORDER — SENNOSIDES 8.6 MG/1
1 TABLET ORAL 2 TIMES DAILY
Status: DISCONTINUED | OUTPATIENT
Start: 2020-09-18 | End: 2020-09-20 | Stop reason: HOSPADM

## 2020-09-18 RX ORDER — HYDROMORPHONE HYDROCHLORIDE 2 MG/1
2-4 TABLET ORAL
Status: DISCONTINUED | OUTPATIENT
Start: 2020-09-18 | End: 2020-09-20 | Stop reason: HOSPADM

## 2020-09-18 RX ORDER — MIDAZOLAM HYDROCHLORIDE 1 MG/ML
INJECTION, SOLUTION INTRAMUSCULAR; INTRAVENOUS AS NEEDED
Status: DISCONTINUED | OUTPATIENT
Start: 2020-09-18 | End: 2020-09-18 | Stop reason: HOSPADM

## 2020-09-18 RX ORDER — TRIAMTERENE/HYDROCHLOROTHIAZID 37.5-25 MG
1 TABLET ORAL DAILY
Status: DISCONTINUED | OUTPATIENT
Start: 2020-09-19 | End: 2020-09-20 | Stop reason: HOSPADM

## 2020-09-18 RX ORDER — MINERAL OIL
180 ENEMA (ML) RECTAL DAILY
Status: DISCONTINUED | OUTPATIENT
Start: 2020-09-19 | End: 2020-09-20 | Stop reason: HOSPADM

## 2020-09-18 RX ORDER — SODIUM CHLORIDE, SODIUM LACTATE, POTASSIUM CHLORIDE, CALCIUM CHLORIDE 600; 310; 30; 20 MG/100ML; MG/100ML; MG/100ML; MG/100ML
125 INJECTION, SOLUTION INTRAVENOUS CONTINUOUS
Status: DISCONTINUED | OUTPATIENT
Start: 2020-09-18 | End: 2020-09-20 | Stop reason: HOSPADM

## 2020-09-18 RX ORDER — CLINDAMYCIN PHOSPHATE 900 MG/50ML
900 INJECTION INTRAVENOUS ONCE
Status: DISCONTINUED | OUTPATIENT
Start: 2020-09-18 | End: 2020-09-18

## 2020-09-18 RX ORDER — METHOTREXATE 2.5 MG/1
20 TABLET ORAL
Status: DISCONTINUED | OUTPATIENT
Start: 2020-09-25 | End: 2020-09-18

## 2020-09-18 RX ORDER — MAGNESIUM SULFATE 100 %
4 CRYSTALS MISCELLANEOUS AS NEEDED
Status: DISCONTINUED | OUTPATIENT
Start: 2020-09-18 | End: 2020-09-18 | Stop reason: HOSPADM

## 2020-09-18 RX ORDER — FOLIC ACID 1 MG/1
1 TABLET ORAL DAILY
Status: DISCONTINUED | OUTPATIENT
Start: 2020-09-19 | End: 2020-09-20 | Stop reason: HOSPADM

## 2020-09-18 RX ORDER — HEPARIN SODIUM (PORCINE) LOCK FLUSH IV SOLN 100 UNIT/ML 100 UNIT/ML
SOLUTION INTRAVENOUS
Status: COMPLETED
Start: 2020-09-18 | End: 2020-09-18

## 2020-09-18 RX ORDER — TEMAZEPAM 15 MG/1
15 CAPSULE ORAL
Status: DISCONTINUED | OUTPATIENT
Start: 2020-09-18 | End: 2020-09-20 | Stop reason: HOSPADM

## 2020-09-18 RX ORDER — HYDROMORPHONE HYDROCHLORIDE 1 MG/ML
0.5 INJECTION, SOLUTION INTRAMUSCULAR; INTRAVENOUS; SUBCUTANEOUS
Status: DISCONTINUED | OUTPATIENT
Start: 2020-09-18 | End: 2020-09-18 | Stop reason: HOSPADM

## 2020-09-18 RX ORDER — ONDANSETRON 2 MG/ML
INJECTION INTRAMUSCULAR; INTRAVENOUS AS NEEDED
Status: DISCONTINUED | OUTPATIENT
Start: 2020-09-18 | End: 2020-09-18 | Stop reason: HOSPADM

## 2020-09-18 RX ORDER — ACETAMINOPHEN 325 MG/1
650 TABLET ORAL
Status: DISCONTINUED | OUTPATIENT
Start: 2020-09-18 | End: 2020-09-20 | Stop reason: HOSPADM

## 2020-09-18 RX ORDER — SODIUM CHLORIDE 0.9 % (FLUSH) 0.9 %
5-40 SYRINGE (ML) INJECTION EVERY 8 HOURS
Status: DISCONTINUED | OUTPATIENT
Start: 2020-09-18 | End: 2020-09-20 | Stop reason: HOSPADM

## 2020-09-18 RX ORDER — SODIUM CHLORIDE 0.9 % (FLUSH) 0.9 %
5-40 SYRINGE (ML) INJECTION AS NEEDED
Status: DISCONTINUED | OUTPATIENT
Start: 2020-09-18 | End: 2020-09-18 | Stop reason: HOSPADM

## 2020-09-18 RX ORDER — SODIUM CHLORIDE 0.9 % (FLUSH) 0.9 %
5-40 SYRINGE (ML) INJECTION EVERY 8 HOURS
Status: DISCONTINUED | OUTPATIENT
Start: 2020-09-18 | End: 2020-09-18 | Stop reason: HOSPADM

## 2020-09-18 RX ORDER — HYDROMORPHONE HYDROCHLORIDE 2 MG/ML
INJECTION, SOLUTION INTRAMUSCULAR; INTRAVENOUS; SUBCUTANEOUS AS NEEDED
Status: DISCONTINUED | OUTPATIENT
Start: 2020-09-18 | End: 2020-09-18 | Stop reason: HOSPADM

## 2020-09-18 RX ORDER — MELATONIN
5000 DAILY
Status: DISCONTINUED | OUTPATIENT
Start: 2020-09-19 | End: 2020-09-20 | Stop reason: HOSPADM

## 2020-09-18 RX ORDER — OXYCODONE HYDROCHLORIDE 5 MG/1
5-10 TABLET ORAL
Status: DISCONTINUED | OUTPATIENT
Start: 2020-09-18 | End: 2020-09-20 | Stop reason: HOSPADM

## 2020-09-18 RX ORDER — PANTOPRAZOLE SODIUM 40 MG/1
40 TABLET, DELAYED RELEASE ORAL
Status: DISCONTINUED | OUTPATIENT
Start: 2020-09-18 | End: 2020-09-20 | Stop reason: HOSPADM

## 2020-09-18 RX ORDER — LIDOCAINE HYDROCHLORIDE 10 MG/ML
1-20 INJECTION INFILTRATION; PERINEURAL
Status: DISCONTINUED | OUTPATIENT
Start: 2020-09-18 | End: 2020-09-20 | Stop reason: HOSPADM

## 2020-09-18 RX ORDER — SODIUM CHLORIDE 0.9 % (FLUSH) 0.9 %
5-40 SYRINGE (ML) INJECTION AS NEEDED
Status: DISCONTINUED | OUTPATIENT
Start: 2020-09-18 | End: 2020-09-20 | Stop reason: HOSPADM

## 2020-09-18 RX ORDER — SODIUM CHLORIDE, SODIUM LACTATE, POTASSIUM CHLORIDE, CALCIUM CHLORIDE 600; 310; 30; 20 MG/100ML; MG/100ML; MG/100ML; MG/100ML
1000 INJECTION, SOLUTION INTRAVENOUS CONTINUOUS
Status: DISCONTINUED | OUTPATIENT
Start: 2020-09-18 | End: 2020-09-18 | Stop reason: HOSPADM

## 2020-09-18 RX ORDER — PROPOFOL 10 MG/ML
INJECTION, EMULSION INTRAVENOUS AS NEEDED
Status: DISCONTINUED | OUTPATIENT
Start: 2020-09-18 | End: 2020-09-18 | Stop reason: HOSPADM

## 2020-09-18 RX ADMIN — HYDROMORPHONE HYDROCHLORIDE 0.2 MG: 2 INJECTION, SOLUTION INTRAMUSCULAR; INTRAVENOUS; SUBCUTANEOUS at 12:28

## 2020-09-18 RX ADMIN — FENTANYL CITRATE 25 MCG: 50 INJECTION, SOLUTION INTRAMUSCULAR; INTRAVENOUS at 13:50

## 2020-09-18 RX ADMIN — MESALAMINE 1000 MG: 250 CAPSULE ORAL at 22:41

## 2020-09-18 RX ADMIN — KETAMINE HYDROCHLORIDE 10 MG: 10 INJECTION, SOLUTION INTRAMUSCULAR; INTRAVENOUS at 12:14

## 2020-09-18 RX ADMIN — HYDROMORPHONE HYDROCHLORIDE 1 MG: 1 INJECTION, SOLUTION INTRAMUSCULAR; INTRAVENOUS; SUBCUTANEOUS at 22:42

## 2020-09-18 RX ADMIN — LIDOCAINE HYDROCHLORIDE 60 MG: 20 INJECTION, SOLUTION EPIDURAL; INFILTRATION; INTRACAUDAL; PERINEURAL at 12:11

## 2020-09-18 RX ADMIN — PROMETHAZINE HYDROCHLORIDE 12.5 MG: 25 INJECTION INTRAMUSCULAR; INTRAVENOUS at 23:40

## 2020-09-18 RX ADMIN — SODIUM CHLORIDE 2 G: 9 INJECTION, SOLUTION INTRAVENOUS at 19:38

## 2020-09-18 RX ADMIN — SODIUM CHLORIDE, SODIUM LACTATE, POTASSIUM CHLORIDE, AND CALCIUM CHLORIDE 125 ML/HR: 600; 310; 30; 20 INJECTION, SOLUTION INTRAVENOUS at 13:53

## 2020-09-18 RX ADMIN — HYDROXYZINE HYDROCHLORIDE 25 MG: 25 TABLET, FILM COATED ORAL at 23:25

## 2020-09-18 RX ADMIN — MESALAMINE 1000 MG: 250 CAPSULE ORAL at 18:19

## 2020-09-18 RX ADMIN — SENNOSIDES 8.6 MG: 8.6 TABLET, FILM COATED ORAL at 22:42

## 2020-09-18 RX ADMIN — MIDAZOLAM 2 MG: 1 INJECTION INTRAMUSCULAR; INTRAVENOUS at 11:58

## 2020-09-18 RX ADMIN — Medication 10 ML: at 22:00

## 2020-09-18 RX ADMIN — GLYCOPYRROLATE 0.1 MG: 0.2 INJECTION INTRAMUSCULAR; INTRAVENOUS at 12:20

## 2020-09-18 RX ADMIN — HYDROMORPHONE HYDROCHLORIDE 0.1 MG: 2 INJECTION, SOLUTION INTRAMUSCULAR; INTRAVENOUS; SUBCUTANEOUS at 12:24

## 2020-09-18 RX ADMIN — PROPOFOL 200 MG: 10 INJECTION, EMULSION INTRAVENOUS at 12:11

## 2020-09-18 RX ADMIN — TEMAZEPAM 15 MG: 15 CAPSULE ORAL at 22:41

## 2020-09-18 RX ADMIN — SODIUM CHLORIDE 2 G: 9 INJECTION, SOLUTION INTRAVENOUS at 12:01

## 2020-09-18 RX ADMIN — ONDANSETRON HYDROCHLORIDE 4 MG: 2 INJECTION INTRAMUSCULAR; INTRAVENOUS at 12:25

## 2020-09-18 RX ADMIN — HYDROMORPHONE HYDROCHLORIDE 0.2 MG: 2 INJECTION, SOLUTION INTRAMUSCULAR; INTRAVENOUS; SUBCUTANEOUS at 12:33

## 2020-09-18 RX ADMIN — HEPARIN SODIUM 1000 UNITS: 200 INJECTION, SOLUTION INTRAVENOUS at 15:36

## 2020-09-18 RX ADMIN — HYDROMORPHONE HYDROCHLORIDE 0.2 MG: 2 INJECTION, SOLUTION INTRAMUSCULAR; INTRAVENOUS; SUBCUTANEOUS at 12:37

## 2020-09-18 RX ADMIN — HYDROMORPHONE HYDROCHLORIDE 0.2 MG: 2 INJECTION, SOLUTION INTRAMUSCULAR; INTRAVENOUS; SUBCUTANEOUS at 12:26

## 2020-09-18 RX ADMIN — SODIUM CHLORIDE, SODIUM LACTATE, POTASSIUM CHLORIDE, AND CALCIUM CHLORIDE 125 ML/HR: 600; 310; 30; 20 INJECTION, SOLUTION INTRAVENOUS at 11:05

## 2020-09-18 RX ADMIN — KETAMINE HYDROCHLORIDE 10 MG: 10 INJECTION, SOLUTION INTRAMUSCULAR; INTRAVENOUS at 12:13

## 2020-09-18 RX ADMIN — METHOTREXATE 20 MG: 2.5 TABLET ORAL at 22:39

## 2020-09-18 RX ADMIN — HYDROMORPHONE HYDROCHLORIDE 0.1 MG: 2 INJECTION, SOLUTION INTRAMUSCULAR; INTRAVENOUS; SUBCUTANEOUS at 12:30

## 2020-09-18 RX ADMIN — DEXAMETHASONE SODIUM PHOSPHATE 4 MG: 4 INJECTION, SOLUTION INTRAMUSCULAR; INTRAVENOUS at 12:25

## 2020-09-18 RX ADMIN — GLYCOPYRROLATE 0.1 MG: 0.2 INJECTION INTRAMUSCULAR; INTRAVENOUS at 12:14

## 2020-09-18 RX ADMIN — HEPARIN SODIUM IN SODIUM CHLORIDE 1000 UNITS: 200 INJECTION INTRAVENOUS at 15:36

## 2020-09-18 RX ADMIN — PANTOPRAZOLE SODIUM 40 MG: 40 TABLET, DELAYED RELEASE ORAL at 18:21

## 2020-09-18 RX ADMIN — HEPARIN SODIUM (PORCINE) LOCK FLUSH IV SOLN 100 UNIT/ML 500 UNITS: 100 SOLUTION at 15:37

## 2020-09-18 RX ADMIN — KETAMINE HYDROCHLORIDE 10 MG: 10 INJECTION, SOLUTION INTRAMUSCULAR; INTRAVENOUS at 12:15

## 2020-09-18 RX ADMIN — FENTANYL CITRATE 25 MCG: 50 INJECTION, SOLUTION INTRAMUSCULAR; INTRAVENOUS at 14:00

## 2020-09-18 RX ADMIN — LEVOCETIRIZINE DIHYDROCHLORIDE 5 MG: 5 TABLET, FILM COATED ORAL at 22:00

## 2020-09-18 RX ADMIN — KETAMINE HYDROCHLORIDE 10 MG: 10 INJECTION, SOLUTION INTRAMUSCULAR; INTRAVENOUS at 12:24

## 2020-09-18 RX ADMIN — FENTANYL CITRATE 25 MCG: 50 INJECTION, SOLUTION INTRAMUSCULAR; INTRAVENOUS at 13:38

## 2020-09-18 RX ADMIN — LIDOCAINE HYDROCHLORIDE 2 ML: 10 INJECTION, SOLUTION INFILTRATION; PERINEURAL at 15:37

## 2020-09-18 NOTE — PERIOP NOTES
Reviewed PTA medication list with patient/caregiver and patient/caregiver denies any additional medications. Patient admits to having a responsible adult care for them at home for at least 24 hours after surgery. Patient encouraged to use gown warming system and informed that using said warming gown to regulate body temperature prior to a procedure has been shown to help reduce the risks of blood clots and infection. Dual skin assessment & fall risk band verification completed with Garth Jane RN.

## 2020-09-18 NOTE — ANESTHESIA POSTPROCEDURE EVALUATION
Post-Anesthesia Evaluation and Assessment    Cardiovascular Function/Vital Signs  Visit Vitals  /74   Pulse (!) 52   Temp 36.4 °C (97.5 °F)   Resp 15   Ht 5' 2.75\" (1.594 m)   Wt 99.9 kg (220 lb 5 oz)   SpO2 95%   BMI 39.34 kg/m²       Patient is status post Procedure(s):  IRRIGATE AND DEBRIDEMENT RIGHT ANKLE AND FOOT. Nausea/Vomiting: Controlled. Postoperative hydration reviewed and adequate. Pain:  Pain Scale 1: FLACC (09/18/20 1405)  Pain Intensity 1: 2 (09/18/20 1405)   Managed. Neurological Status:   Neuro (WDL): Exceptions to WDL (09/18/20 1345)   At baseline. Mental Status and Level of Consciousness: Baseline and stable. Pulmonary Status:   O2 Device: Nasal cannula (09/18/20 1345)   Adequate oxygenation and airway patent. Complications related to anesthesia: None    Post-anesthesia assessment completed. No concerns. Patient has met all discharge requirements.     Signed By: Mitch Kramer MD

## 2020-09-18 NOTE — PROGRESS NOTES
Problem: Mobility Impaired (Adult and Pediatric)  Goal: *Acute Goals and Plan of Care (Insert Text)  Description: Physical Therapy Goals  Initiated 9/18/2020  to be met within 1-2 days  STG's:  1. Bed mobility:  Supine to/from sit with S in prep for ADL activity. 2. Transfers:  Sit to/from stand with S/knee scooter in prep for gait. LTG's  1. Ambulation:  Mobilize self 50 ft. Or greater with S/knee scooter maintaining NWB R LE for home mobility at discharge. Outcome: Progressing Towards Goal  []  Patient has met MD mobilization critieria for d/c home   []  Recommend HH with 24 hour adult care   [x]  Benefit from additional acute PT session to address: To address goals as above for increased independence in functional mobility    PHYSICAL THERAPY EVALUATION    Patient: Cortney Rm (36 y.o. female)  Date: 9/18/2020  Primary Diagnosis: POST OP REACTION TO SUTURES, SUTURE ABSCESS  Procedure(s) (LRB):  IRRIGATE AND DEBRIDEMENT RIGHT ANKLE AND FOOT (Right) Day of Surgery   Precautions:Fall, NWB  NWB  PLOF: using knee scooter for ambulation    ASSESSMENT :  Based on the objective data described below, the patient presents with decreased independence in functional mobility with regard to bed mobility, transfers, gait, and activity tolerance. Pt required SBA for bed mobility and transfers with RW/CGA, then SBA with knee scooter to amb to bathroom and back to bed. Pt able to void and perform self care for hygiene with S/SBA. Pt reports pain 5/10 pre/post session. Kimberlee slow,   Pt left back in bed with R LE elevated and with all needs in reach and nurse notified. Spouse present throughout session. Recommend HHPT vs none upon discharge. Patient will benefit from skilled intervention to address the above impairments.   Patient's rehabilitation potential is considered to be Good  Factors which may influence rehabilitation potential include:   [x]         None noted  []         Mental ability/status  [] Medical condition  []         Home/family situation and support systems  []         Safety awareness  []         Pain tolerance/management  []         Other:      PLAN :  Recommendations and Planned Interventions:  [x]           Bed Mobility Training             []    Neuromuscular Re-Education  [x]           Transfer Training                   []    Orthotic/Prosthetic Training  [x]           Gait Training                          []    Modalities  [x]           Therapeutic Exercises           []    Edema Management/Control  [x]           Therapeutic Activities            []    Family Training/Education  [x]           Patient Education  []           Other (comment):    Frequency/Duration: Patient will be followed by physical therapy 1-2 times per day to address goals. Discharge Recommendations: Home Health vs None  Further Equipment Recommendations for Discharge: N/A     SUBJECTIVE:   Patient stated I feel okay.     OBJECTIVE DATA SUMMARY:     Past Medical History:   Diagnosis Date    Acute sinusitis     Adverse effect of anesthesia     hard to awaken    [address]osing spondylitis ([address]     Asthma     sports and allergy induced    Baker's cyst     knees    Carpal tunnel syndrome     Crohn disease ([address]     Deep venous thrombosis ([address] 2009    right leg    Diverticulitis     Edema     while sitting long periors    Tammi-Danlos syndrome     Endometriosis     Fibromyalgia     GERD (gastroesophageal reflux disease)     James's disease     Heart burn     Hiatal hernia     Hypothyroid     goiter hashimoto    IBD (inflammatory bowel disease)     Irregular heart beat     Lumbar degenerative disc disease     lumbar disc displacement    Lumbosacral spondylosis     Nausea & vomiting     wants IV as the oral did not keep her from multi vomitins    Osteoarthritis     Rectocele     Recurrent boils     Rheumatoid arthritis (HCC)     SOBOE (shortness of breath on exertion)     [facility]    TMJ (dislocation of temporomandibular joint)     Ulcerative colitis [facility]     Ulcerative colitis [facility]     Raffy Parkinson White pattern seen on electrocardiogram     Raffy-Parkinson-White (WPW) pattern 2019     Past Surgical History:   Procedure Laterality Date    CARDIAC SURG PROCEDURE UNLIST  11/29/2019    svt ablation    HX BREAST REDUCTION Bilateral 11/15/2018    HX GYN      rectocele repair x 3    HX GYN  2020    labialoplasty with vaginal scar     HX HEENT      Septoplasty    HX HEENT      esophageal stretch *10    HX HYSTERECTOMY  1995    HX ORTHOPAEDIC Right 1997    bunionectomy, lujan's neuroma    HX ORTHOPAEDIC Right 2014    Thumb surgery    HX OTHER SURGICAL  2019    Scar revision    HX SHOULDER ARTHROSCOPY Left 2019    HX TONSILLECTOMY  1994    HX UROLOGICAL  08/30/2007, 2020    cystoscopy , ureteral stent    HX UROLOGICAL      bladder repair    HX UROLOGICAL  2005    Posterior repair, rectocele repair    HX UROLOGICAL  2016    cystoscopy    HX UROLOGICAL  2017    cystocelectomy    HX UROLOGICAL  03/2018    cystocelectomy     Barriers to Learning/Limitations: yes;  physical  Compensate with: Verbal Cues  Home Situation:  Home Situation  Home Environment: Private residence  # Steps to Enter: 6(3+3)  Rails to Enter: Yes  Hand Rails : Bilateral  One/Two Story Residence: Other (Comment)(tri-level with 6 steps to each level)  # of Interior Steps: 6  Living Alone: No  Support Systems: Spouse/Significant Other/Partner  Patient Expects to be Discharged to[de-identified] Private residence  Current DME Used/Available at Home: Cane, straight, Other (comment), Walker, rollator, Grab bars, Shower chair(knee scooter)  Tub or Shower Type: Tub/Shower combination  Critical Behavior:  Neurologic State: Alert; Appropriate for age  Skin Condition/Temp: Dry;Warm  Skin Integrity: Incision (comment)(post op dressing R LE)  Skin Integumentary  Skin Color: Appropriate for ethnicity  Skin Condition/Temp: Dry;Warm  Skin Integrity: Incision (comment)(post op dressing R LE)  Turgor: Non-tenting  Strength:    Strength: Generally decreased, functional(R LE N/T s/p surgery as above)  Tone & Sensation:   Sensation: Intact  Range Of Motion:  AROM: Generally decreased, functional(R LE NT s/p susrgery R foot/ankel)  Functional Mobility:  Bed Mobility:  Supine to Sit: Stand-by assistance  Sit to Supine: Stand-by assistance  Scooting: Supervision  Transfers:  Sit to Stand: Stand-by assistance  Stand to Sit: Stand-by assistance  Balance:   Sitting: Intact  Standing: Intact; With support(knee scooter)  Ambulation/Gait Training:  Distance (ft): 20 Feet (ft)  Assistive Device: Gait belt;Walker, rolling(knee scooter)  Ambulation - Level of Assistance: Stand-by assistance  Gait Description (WDL): Exceptions to WDL  Gait Abnormalities: Other(uses knee scooter)  Right Side Weight Bearing: Non-weight bearing  Base of Support: Shift to left  Pain:  Pain level pre-treatment: 5/10   Pain level post-treatment: 5/10   Pain Intervention(s) : Medication (see MAR); Rest, Ice, Repositioning  Response to intervention: Nurse notified, See doc flow  Activity Tolerance:   Fair   Please refer to the flowsheet for vital signs taken during this treatment. After treatment:   []         Patient left in no apparent distress sitting up in chair  [x]         Patient left in no apparent distress in bed  [x]         Call bell left within reach  [x]         Nursing notified  [x]         Caregiver present  []         Bed alarm activated  []         SCDs applied    COMMUNICATION/EDUCATION:   [x]         Role of Physical Therapy in the acute care setting. [x]         Fall prevention education was provided and the patient/caregiver indicated understanding. [x]         Patient/family have participated as able in goal setting and plan of care. [x]         Patient/family agree to work toward stated goals and plan of care.   []         Patient understands intent and goals of therapy, but is neutral about his/her participation. []         Patient is unable to participate in goal setting/plan of care: ongoing with therapy staff.  []         Other:     Thank you for this referral.  Chaparro Joseph, PT   Time Calculation: 25 mins      Eval Complexity: History: HIGH Complexity :3+ comorbidities / personal factors will impact the outcome/ POC Exam:LOW Complexity : 1-2 Standardized tests and measures addressing body structure, function, activity limitation and / or participation in recreation  Presentation: LOW Complexity : Stable, uncomplicated  Clinical Decision Making:Low Complexity  Overall Complexity:LOW

## 2020-09-18 NOTE — ANESTHESIA PREPROCEDURE EVALUATION
Relevant Problems   No relevant active problems       Anesthetic History     PONV          Review of Systems / Medical History  Patient summary reviewed, nursing notes reviewed and pertinent labs reviewed    Pulmonary            Asthma (exercise induced only - no inhaler use in months)        Neuro/Psych   Within defined limits           Cardiovascular            Dysrhythmias (WPW S/P ablation - much better)     Pertinent negatives: Hypertension: fluid retention. Exercise tolerance: >4 METS  Comments: wpw but had ablation which is working   GI/Hepatic/Renal     GERD: well controlled      PUD     Endo/Other      Hypothyroidism: well controlled  Arthritis (rheumatoid)     Other Findings   Comments: Takes fluid pill for edema  crohns and aks not affecting neck.            Physical Exam    Airway  Mallampati: II  TM Distance: 4 - 6 cm  Neck ROM: normal range of motion   Mouth opening: Normal     Cardiovascular    Rhythm: regular  Rate: normal         Dental    Dentition: Bridges and Caps/crowns     Pulmonary  Breath sounds clear to auscultation               Abdominal         Other Findings            Anesthetic Plan    ASA: 3  Anesthesia type: general          Induction: Intravenous  Anesthetic plan and risks discussed with: Patient      Ordering phenergan for post op and scop patch she doesn't want due to allergy

## 2020-09-18 NOTE — PROCEDURES
Vascular & Interventional Radiology Brief Procedure Note    Interventional Radiologist: Staci Villasenor MD    Pre-operative Diagnosis:  Long term venous access     Post-operative Diagnosis: Same as pre-op dx    Procedure(s) Performed:  Ultrasound/fluoroscopic guided PICC line placement    Anesthesia:  Local      Findings:  Successful left arm dual lumen 5F PICC line placement. Ready for immediate use.       Complications: None    Estimated Blood Loss:  minimal    Tubes and Drains: as above    Specimens: None    Condition: Stable       Staci Villasenor MD  [facility]  Vascular & Interventional Radiology  9/18/2020

## 2020-09-18 NOTE — ROUTINE PROCESS
Bedside and Verbal shift change report given to [address] [place] [address] by Clearance Romberg, RN. Report included the following information SBAR, Kardex, OR Summary, Intake/Output and MAR.

## 2020-09-18 NOTE — CONSULTS
[facility]  (A Division of [address] [place] [address])      Consultation Note      Date of Admission: 9/18/2020    Date of Consultation: 9/18/2020    Referred by: Sylwia Tovar MD    Reason for Referral: celluliltis      Current Antimicrobials:    Prior Antimicrobials:  none 1. Clindamycin PO  2. Doxy PO       Assessment: Rec / Plan:   Complicated Incisional cellulitis - Pseudomonas aerguinosa    Bleh. Will need at least 10d course of IV meropenem (less cross-reactivity to PCN allergies); first dose in hospital to [facility]. Proof is in pudding/her flesh -- if infection tracks deeper abutting bone, she will need longer (6wks) therapy. I'll check a preop CRP specimen. I'll put another note on chart after surgery detailing [facility] - dose/length/labs. DOS    Will need overnight admission to facilitate all this:    PICC  Meropenem 2gm IV q8h doses in supervised/inpatient setting. Failing this, she will need ICU TOW/admission for PCN desensitization before going to extended Pip/tzb infusions. Home Health set up to complete therapy. Multiple abx allergies    FQ class and PCN/Cephalosporin (rash) class    Inflammatory Bowel Disease    Hold infliximab therapy this course    Fibromyalgia    SVT          Microbiology:  9/18 - OR samples pending/future       9/11 (?) - Wound cx - (+) Pseudomonas aeruginosa (S - all)      Lines / Catheters: peripheral      HPI:  CC:  \"my incisions never completely healed\"  Ms Celso Zafar is a 65y WF very-experienced in hospitalizations/surgeries who is being taken to the OR later this morning for incisional debridement. Had last surgery 7.31 for a-scope debridement R ankle with tarsal tunnel release/subtaler ganglion-neuroma removal (3-4th) for symptomatic painful ambulation.   Following this surgery, her recovery was uneventful until approx 2-3wks later with incisional tender/swelling with serous DC in both spots (dorsal over 3rd distal MT) and medial R ankle. No f/s/c. Has had wax/wane inflammation culminating in today's debridement. Has taken a course of clindamycin and doxycycline to no avail. Outpatient cultures taken grew broadly-susceptible Pseudomonas aeruginosa but with her multiple drug allergies, she is not able to take ciproloxacin. Retired .           There are no active hospital problems to display for this patient.     Past Medical History:   Diagnosis Date    Acute sinusitis     Adverse effect of anesthesia     hard to awaken    Ankylosing spondylitis ([address]     Asthma     sports and allergy induced    Baker's cyst     knees    Carpal tunnel syndrome     Crohn disease ([address]     Deep venous thrombosis ([address] 2009    right leg    Diverticulitis     Edema     while sitting long periors    Tammi-Danlos syndrome     Endometriosis     Fibromyalgia     GERD (gastroesophageal reflux disease)     [place]'s disease     Heart burn     Hiatal hernia     Hypothyroid     goiter hashimoto    IBD (inflammatory bowel disease)     Irregular heart beat     Lumbar degenerative disc disease     lumbar disc displacement    Lumbosacral spondylosis     Nausea & vomiting     wants IV as the oral did not keep her from multi vomitins    Osteoarthritis     Rectocele     Recurrent boils     Rheumatoid arthritis (HCC)     SOBOE (shortness of breath on exertion)     SVT (supraventricular tachycardia) ([address] 2019    TMJ (dislocation of temporomandibular joint)     Ulcerative colitis ([address]     Ulcerative colitis ([address]     Raffy Parkinson White pattern seen on electrocardiogram     Raffy-Parkinson-White (WPW) pattern 2019     Past Surgical History:   Procedure Laterality Date    CARDIAC SURG PROCEDURE UNLIST  11/29/2019    svt ablation    HX BREAST REDUCTION Bilateral 11/15/2018    HX GYN      rectocele repair x 3    HX GYN  2020    labialoplasty with vaginal scar     HX HEENT      Septoplasty    HX HEENT esophageal stretch *10    HX HYSTERECTOMY  1995    HX ORTHOPAEDIC Right 1997    bunionectomy, lujan's neuroma    HX ORTHOPAEDIC Right 2014    Thumb surgery    HX OTHER SURGICAL  2019    Scar revision    HX SHOULDER ARTHROSCOPY Left 2019    HX TONSILLECTOMY  1994    HX UROLOGICAL  08/30/2007, 2020    cystoscopy , ureteral stent    HX UROLOGICAL      bladder repair    HX UROLOGICAL  2005    Posterior repair, rectocele repair    HX UROLOGICAL  2016    cystoscopy    HX UROLOGICAL  2017    cystocelectomy    HX UROLOGICAL  03/2018    cystocelectomy     Family History   Problem Relation Age of Onset   [address] [facility] Arthritis-rheumatoid Mother     Lung Disease Mother     Asthma Mother     Asthma Father     Lung Disease Sister     Diabetes Sister     Arthritis-rheumatoid Sister     Ulcerative Colitis Sister     MS Sister     Arthritis-rheumatoid Sister     Ulcerative Colitis Sister     Lung Disease Sister     Diabetes Brother      Social History     Socioeconomic History    Marital status:      Spouse name: Not on file    Number of children: Not on file    Years of education: Not on file    Highest education level: Not on file   Occupational History    Not on file   Social Needs    Financial resource strain: Not on file    Food insecurity     Worry: Not on file     Inability: Not on file    Transportation needs     Medical: Not on file     Non-medical: Not on file   Tobacco Use    Smoking status: Never Smoker    Smokeless tobacco: Never Used   Substance and Sexual Activity    Alcohol use: No    Drug use: Never    Sexual activity: Not Currently   Lifestyle    Physical activity     Days per week: Not on file     Minutes per session: Not on file    Stress: Not on file   Relationships    Social connections     Talks on phone: Not on file     Gets together: Not on file     Attends Confucianism service: Not on file     Active member of club or organization: Not on file     Attends meetings of clubs or organizations: Not on file     Relationship status: Not on file    Intimate partner violence     Fear of current or ex partner: Not on file     Emotionally abused: Not on file     Physically abused: Not on file     Forced sexual activity: Not on file   Other Topics Concern    Not on file   Social History Narrative    Not on file       Allergies:  Latex; Other food; Nickel; Penicillins; Adhesive tape-silicones; Bee venom protein (honey bee); Ceclor [cefaclor]; Celery; Ciprofloxacin; Egg; Levaquin [levofloxacin]; Methylprednisolone; Mold; Mushroom; Neomycin; Neosporin [benzalkonium chloride]; Nsaids (non-steroidal anti-inflammatory drug); Nuts [tree nut]; Other plant, animal, environmental; Potato; and Sulfa (sulfonamide antibiotics)     Medications:  Current Facility-Administered Medications   Medication Dose Route Frequency    clindamycin (CLEOCIN) 900mg D5W 50mL IVPB (premix)  900 mg IntraVENous ONCE    lactated Ringers infusion  125 mL/hr IntraVENous CONTINUOUS    meropenem (MERREM) 2 g in 0.9% sodium chloride 100 mL IVPB  2 g IntraVENous Q8H        ROS:  Constitutional: negative for fevers, chills and sweats  Respiratory: negative for cough, sputum or dyspnea on exertion  Cardiovascular: negative for chest pain, dyspnea  Gastrointestinal: negative for nausea, vomiting, diarrhea and abdominal pain     Physical Exam:    HPI:  Temp (24hrs), Av.6 °F (37 °C), Min:98.6 °F (37 °C), Max:98.6 °F (37 °C)    Visit Vitals  /81 (BP 1 Location: Left arm, BP Patient Position: At rest)   Pulse 80   Temp 98.6 °F (37 °C)   Ht 5' 2.75\" (1.594 m)   Wt 99.9 kg (220 lb 5 oz)   BMI 39.34 kg/m²       General: Well developed, well nourished 64 y.o. WHITE OR  female in no acute distress.   ENT: ENT exam normal, no neck nodes or sinus tenderness  Head: normocephalic, without obvious abnormality  Mouth:  mucous membranes moist, pharynx normal without lesions  Neck: supple, symmetrical, trachea midline Cardio:  regular rate and rhythm, S1, S2 normal, no murmur, click, rub or gallop  Chest: no chest wall deformities or tenderness; small macular red rash on L breast; respiratory effort normal  Lungs: clear to auscultation, no wheezes or rales and unlabored breathing  Abdomen: soft, non-tender. Bowel sounds normal. No masses, no organomegaly.   Extremities:  no redness or tenderness in the calves or thighs, no edema; R foot/ankle with 2 incisions (one over dorsum 3-4th MT distal head area - clean/dry; and medial ankle with hot/erythematous incision with serous DC)  Neuro: Grossly normal       Lab results:    Chemistry  Recent Labs     09/17/20  1412   GLU 84      K 3.3*      CO2 30   BUN 18   CREA 0.85   CA 9.3   AGAP 7   BUCR 21*      TP 7.7   ALB 4.0   GLOB 3.7   AGRAT 1.1       CBC w/ Diff  Recent Labs     09/17/20  1412   WBC 7.7   RBC 4.31   HGB 13.0   HCT 39.4          Microbiology  All Micro Results     None           Freddy Laureano MD  Cell [phone]  [facility]  9/18/2020   10:54 AM

## 2020-09-18 NOTE — PERIOP NOTES
Patient not assigned to room # at this time. Pt to go to IR to have PICC line procedure while awaiting room assignment.

## 2020-09-18 NOTE — BRIEF OP NOTE
Brief Postoperative Note    Patient: Bárbara Tripp  YOB: 1960  MRN: [id number]    Date of Procedure: 9/18/2020     Pre-Op Diagnosis: POST OP REACTION TO SUTURES, SUTURE ABSCESS    Post-Op Diagnosis: Same as preoperative diagnosis.       Procedure(s):  IRRIGATE AND DEBRIDEMENT RIGHT ANKLE AND FOOT    Surgeon(s):  Wendy Espinal MD    Surgical Assistant: Mandy Lowe    Anesthesia: General     Estimated Blood Loss (mL): less than 354     Complications: None    Specimens:   ID Type Source Tests Collected by Time Destination   1 : swab from right foot  Wound Foot, Right CULTURE, ANAEROBIC, CULTURE, WOUND W Tashi Lane MD 9/18/2020 1227 Microbiology        Implants: * No implants in log *    Drains: * No LDAs found *    Findings: infected suture, no deep tracking      Electronically Signed by Maggy Galaviz MD on 9/18/2020 at 12:55 PM

## 2020-09-18 NOTE — PERIOP NOTES
Patient assigned to room 303. Family updated on patient current status and room assignment at this time.

## 2020-09-18 NOTE — H&P
History and Physical        Patient: Paloma Jaimes               Sex: female          DOA: 9/18/2020         YOB: 1960      Age:  64 y.o.        LOS:  LOS: 0 days        HPI:     Paloma Jaimes is a 64 y.o. female with continued complaint of right ankle pain, did well for 4 weeks then noted some redness and suture spitting. Pt with multiple drug allergies limiting oral abx.     Past Medical History:   Diagnosis Date    Acute sinusitis     Adverse effect of anesthesia     hard to awaken    Ankylosing spondylitis ([address]     Asthma     sports and allergy induced    Baker's cyst     knees    Carpal tunnel syndrome     Crohn disease ([address]     Deep venous thrombosis ([address] 2009    right leg    Diverticulitis     Edema     while sitting long periors    Tammi-Danlos syndrome     Endometriosis     Fibromyalgia     GERD (gastroesophageal reflux disease)     James's disease     Heart burn     Hiatal hernia     Hypothyroid     goiter hashimoto    IBD (inflammatory bowel disease)     Irregular heart beat     Lumbar degenerative disc disease     lumbar disc displacement    Lumbosacral spondylosis     Nausea & vomiting     wants IV as the oral did not keep her from multi vomitins    Osteoarthritis     Rectocele     Recurrent boils     Rheumatoid arthritis (HCC)     SOBOE (shortness of breath on exertion)     SVT (supraventricular tachycardia) ([address] 2019    TMJ (dislocation of temporomandibular joint)     Ulcerative colitis ([address]     Ulcerative colitis ([address]     Raffy Parkinson White pattern seen on electrocardiogram     Raffy-Parkinson-White (WPW) pattern 2019       Past Surgical History:   Procedure Laterality Date    CARDIAC SURG PROCEDURE UNLIST  11/29/2019    svt ablation    HX BREAST REDUCTION Bilateral 11/15/2018    HX GYN      rectocele repair x 3    HX GYN  2020    labialoplasty with vaginal scar     HX HEENT      Septoplasty    HX HEENT      esophageal stretch *10    HX HYSTERECTOMY  1995    HX ORTHOPAEDIC Right 1997    bunionectomy, lujan's neuroma    HX ORTHOPAEDIC Right 2014    Thumb surgery    HX OTHER SURGICAL  2019    Scar revision    HX SHOULDER ARTHROSCOPY Left 2019    HX TONSILLECTOMY  1994    HX UROLOGICAL  08/30/2007, 2020    cystoscopy , ureteral stent    HX UROLOGICAL      bladder repair    HX UROLOGICAL  2005    Posterior repair, rectocele repair    HX UROLOGICAL  2016    cystoscopy    HX UROLOGICAL  2017    cystocelectomy    HX UROLOGICAL  03/2018    cystocelectomy       Family History   Problem Relation Age of Onset   [facility] Arthritis-rheumatoid Mother     Lung Disease Mother     Asthma Mother     Asthma Father     Lung Disease Sister     Diabetes Sister     Arthritis-rheumatoid Sister     Ulcerative Colitis Sister     MS Sister    [address] [facility]"" Arthritis-rheumatoid Sister     Ulcerative Colitis Sister     Lung Disease Sister     Diabetes Brother        Social History     Socioeconomic History    Marital status:      Spouse name: Not on file    Number of children: Not on file    Years of education: Not on file    Highest education level: Not on file   Tobacco Use    Smoking status: Never Smoker    Smokeless tobacco: Never Used   Substance and Sexual Activity    Alcohol use: No    Drug use: Never    Sexual activity: Not Currently       Prior to Admission medications    Medication Sig Start Date End Date Taking? Authorizing Provider   doxycycline (VIBRAMYCIN) 100 mg capsule two (2) times a day. Yes Provider, Historical   Omeprazole delayed release (PRILOSEC D/R) 20 mg tablet Take 20 mg by mouth daily. Yes Provider, Historical   mesalamine (PENTASA) 500 mg CR capsule Take 1,000 mg by mouth three (3) times daily. Yes Provider, Historical   levothyroxine (SYNTHROID) 112 mcg tablet Take 112 mcg by mouth Daily (before breakfast). Yes Provider, Historical   levocetirizine (XYZAL) 5 mg tablet Take 5 mg by mouth nightly.    Yes Provider, Historical   fexofenadine (ALLEGRA) 180 mg tablet Take 180 mg by mouth daily. Yes Provider, Historical   folic acid (FOLVITE) 1 mg tablet Take 1 mg by mouth daily. Yes Provider, Historical   estradiol (ESTROGEL) 1.25 gram/actuation glpm 1 Dose by TransDERmal route daily. Yes Provider, Historical   temazepam (RESTORIL) 15 mg capsule Take 15 mg by mouth nightly. Yes Provider, Historical   ascorbic acid/collagen hydr (COLLAGEN PLUS VITAMIN C PO) Take  by mouth daily. Provider, Historical   naloxone (NARCAN) 4 mg/actuation nasal spray Use 1 spray intranasally, then discard. Repeat with new spray every 2 min as needed for opioid overdose symptoms, alternating nostrils. 7/31/20   Suzanne Bradford MD   vitamin E (AQUA GEMS) 400 unit capsule Take 400 Units by mouth daily. Provider, Historical   triamterene-hydroCHLOROthiazide (DYAZIDE) 37.5-25 mg per capsule Take 1 Cap by mouth daily. Provider, Historical   diclofenac (VOLTAREN) 1 % gel Apply 2 g to affected area four (4) times daily as needed. Provider, Historical   infliximab (REMICADE IV) by IntraVENous route every six (6) weeks. Provider, Historical   methotrexate (RHEUMATREX) 2.5 mg tablet Take 20 mg by mouth Every Friday. Provider, Historical   albuterol (PROAIR HFA) 90 mcg/actuation inhaler Take 2 Puffs by inhalation every four (4) hours as needed for Wheezing. Provider, Historical   EPINEPHrine (EPIPEN) 0.3 mg/0.3 mL injection 0.3 mg by IntraMUSCular route once as needed. Provider, Historical   hydrOXYzine HCl (ATARAX) 25 mg tablet Take 25 mg by mouth three (3) times daily as needed for Itching. Provider, Historical   potassium 99 mg tablet Take 99 mg by mouth daily. Provider, Historical   cholecalciferol (VITAMIN D3) 1,000 unit cap Take 5,000 Units by mouth daily.     Provider, Historical       Allergies   Allergen Reactions    Latex Hives     At contact site    Other Food Hives and Other (comments)     SUGAR, TOMATO, POTATO, CHICKEN, GARLIC, LETTUCE, MILK, TEA, PEANUTS, CHOCOLATE, CORN, BREWERS YEAST, EGGS, ONIONS, CARROT, RICE    Nickel Itching and Swelling    Penicillins Hives    Adhesive Tape-Silicones Hives     Other reaction(s): Dermatological problems, e.g., rash, hives    Bee Venom Protein (Honey Bee) Hives and Rash    Ceclor [Cefaclor] Hives    Celery Itching and Swelling    Ciprofloxacin Hives    Egg Hives     Can not take flu vaccine    Levaquin [Levofloxacin] Rash    Methylprednisolone Hives and Rash    Mold Hives    Mushroom Itching and Swelling    Neomycin Other (comments)     allergy test    Neosporin [Benzalkonium Chloride] Itching    Nsaids (Non-Steroidal Anti-Inflammatory Drug) Hives    Nuts [Tree Nut] Itching and Swelling    Other Plant, Animal, Environmental Hives     SEE LIST    Potato Rash and Swelling    Sulfa (Sulfonamide Antibiotics) Rash     Break out in rash all over       Review of Systems  Pertinent items are noted in the History of Present Illness. Physical Exam:      Visit Vitals  /81 (BP 1 Location: Left arm, BP Patient Position: At rest)   Pulse 80   Temp 98.6 °F (37 °C)   Resp 16   Ht 5' 2.75\" (1.594 m)   Wt 99.9 kg (220 lb 5 oz)   SpO2 98%   BMI 39.34 kg/m²       Physical Exam:  mulitple areas of suture spitting, reness, clear drainage      Assessment/Plan     Active Problems:    * No active hospital problems.  *    Post op suture abscess  [place]  Admit for observation, PICC and IVAbx  Thanks to Dr. Galina Xavier

## 2020-09-18 NOTE — PERIOP NOTES
Visit Vitals  /76   Pulse 71   Temp 97.5 °F (36.4 °C)   Resp 14   Ht 5' 2.75\" (1.594 m)   Wt 99.9 kg (220 lb 5 oz)   SpO2 96%   BMI 39.34 kg/m²             Date 09/17/20 0700 - 09/18/20 0659(Not Admitted) 09/18/20 0700 - 09/19/20 0659   Shift 6356-3886 0171-6084 24 Hour Total 7390-7340 5749-8612 24 Hour Total   INTAKE   I.V.    1000  1000     I.V.    300  300     Volume (lactated Ringers infusion)    700  700   Shift Total(mL/kg)    1000(10)  1000(10)   OUTPUT   Shift Total(mL/kg)         NET    1000  1000   Weight (kg)    99.9 99.9 99.9

## 2020-09-18 NOTE — H&P
The patient is an appropriate candidate to undergo PICC. Patient assessed immediately prior to induction. Anesthesia plan as follows: Local/No Anesthesia. History and Physical update:  H&P was reviewed and the patient was examined. No changes have occurred in the patient's condition since the H&P was completed.     Ivan Sutton MD  Vascular & Interventional Radiology  [facility]  9/18/2020

## 2020-09-18 NOTE — PERIOP NOTES
Notified Dr. Juan Manuel Griggs of patient's recent potassium lab result. Okay to proceed. No new orders obtained.

## 2020-09-18 NOTE — PERIOP NOTES
TRANSFER - OUT REPORT:    Verbal report given to KESHIA Blackburn on [address]  being transferred to [facility] for routine post - op       Report consisted of patients Situation, Background, Assessment and   Recommendations(SBAR). Information from the following report(s) SBAR, Procedure Summary, Intake/Output and MAR was reviewed with the receiving nurse. Lines:   PICC Double Lumen 38/09/37 Left;Basilic (Active)       Peripheral IV 09/18/20 Left Wrist (Active)   Site Assessment Clean, dry, & intact 09/18/20 1345   Phlebitis Assessment 0 09/18/20 1345   Infiltration Assessment 0 09/18/20 1345   Dressing Status Clean, dry, & intact 09/18/20 1345   Dressing Type Tape;Transparent 09/18/20 1345   Hub Color/Line Status Pink; Infusing;Patent 09/18/20 1345   Alcohol Cap Used No 09/18/20 1107        Opportunity for questions and clarification was provided.       Patient transported with:   Registered Nurse

## 2020-09-18 NOTE — PERIOP NOTES
Juan Vale per Dr. Carolina Leone. Patient stated she was allergic to clindamycin.      Discontinued Clindamycin per Dr. Tali Taylor

## 2020-09-19 ENCOUNTER — HOME HEALTH ADMISSION (OUTPATIENT)
Dept: HOME HEALTH SERVICES | Facility: HOME HEALTH | Age: 60
End: 2020-09-19
Payer: COMMERCIAL

## 2020-09-19 PROBLEM — L02.619 CELLULITIS AND ABSCESS OF FOOT: Status: ACTIVE | Noted: 2020-09-19

## 2020-09-19 PROBLEM — L03.119 CELLULITIS AND ABSCESS OF FOOT: Status: ACTIVE | Noted: 2020-09-19

## 2020-09-19 PROCEDURE — 74011250636 HC RX REV CODE- 250/636: Performed by: ORTHOPAEDIC SURGERY

## 2020-09-19 PROCEDURE — 97535 SELF CARE MNGMENT TRAINING: CPT

## 2020-09-19 PROCEDURE — 97165 OT EVAL LOW COMPLEX 30 MIN: CPT

## 2020-09-19 PROCEDURE — 97116 GAIT TRAINING THERAPY: CPT

## 2020-09-19 PROCEDURE — 74011000258 HC RX REV CODE- 258: Performed by: ORTHOPAEDIC SURGERY

## 2020-09-19 PROCEDURE — 74011250637 HC RX REV CODE- 250/637: Performed by: ORTHOPAEDIC SURGERY

## 2020-09-19 PROCEDURE — 99218 HC RM OBSERVATION: CPT

## 2020-09-19 PROCEDURE — 97530 THERAPEUTIC ACTIVITIES: CPT

## 2020-09-19 PROCEDURE — 96374 THER/PROPH/DIAG INJ IV PUSH: CPT

## 2020-09-19 RX ORDER — HYDROCODONE BITARTRATE AND ACETAMINOPHEN 5; 325 MG/1; MG/1
1-2 TABLET ORAL
Qty: 20 TAB | Refills: 0 | Status: SHIPPED | OUTPATIENT
Start: 2020-09-19 | End: 2020-09-22

## 2020-09-19 RX ORDER — ACETAMINOPHEN 325 MG/1
650 TABLET ORAL
Qty: 50 TAB | Refills: 0 | Status: SHIPPED | OUTPATIENT
Start: 2020-09-19

## 2020-09-19 RX ORDER — DIPHENHYDRAMINE HCL 25 MG
25 CAPSULE ORAL
Status: DISCONTINUED | OUTPATIENT
Start: 2020-09-19 | End: 2020-09-20 | Stop reason: HOSPADM

## 2020-09-19 RX ADMIN — Medication 400 UNITS: at 12:01

## 2020-09-19 RX ADMIN — LEVOTHYROXINE SODIUM 112 MCG: 0.07 TABLET ORAL at 07:12

## 2020-09-19 RX ADMIN — Medication 10 ML: at 14:32

## 2020-09-19 RX ADMIN — SODIUM CHLORIDE 2 G: 9 INJECTION, SOLUTION INTRAVENOUS at 19:33

## 2020-09-19 RX ADMIN — FOLIC ACID 1 MG: 1 TABLET ORAL at 09:58

## 2020-09-19 RX ADMIN — DIPHENHYDRAMINE HYDROCHLORIDE 25 MG: 25 CAPSULE ORAL at 12:01

## 2020-09-19 RX ADMIN — SENNOSIDES 8.6 MG: 8.6 TABLET, FILM COATED ORAL at 09:58

## 2020-09-19 RX ADMIN — PANTOPRAZOLE SODIUM 40 MG: 40 TABLET, DELAYED RELEASE ORAL at 16:28

## 2020-09-19 RX ADMIN — TRIAMTERENE AND HYDROCHLOROTHIAZIDE 1 TABLET: 37.5; 25 TABLET ORAL at 09:58

## 2020-09-19 RX ADMIN — MESALAMINE 1000 MG: 250 CAPSULE ORAL at 16:28

## 2020-09-19 RX ADMIN — VITAMIN D, TAB 1000IU (100/BT) 5 TABLET: 25 TAB at 12:01

## 2020-09-19 RX ADMIN — ACETAMINOPHEN 650 MG: 325 TABLET ORAL at 14:30

## 2020-09-19 RX ADMIN — PANTOPRAZOLE SODIUM 40 MG: 40 TABLET, DELAYED RELEASE ORAL at 07:12

## 2020-09-19 RX ADMIN — OXYCODONE 10 MG: 5 TABLET ORAL at 19:39

## 2020-09-19 RX ADMIN — Medication 180 MG: at 09:00

## 2020-09-19 RX ADMIN — Medication 10 ML: at 07:17

## 2020-09-19 RX ADMIN — SODIUM CHLORIDE 2 G: 9 INJECTION, SOLUTION INTRAVENOUS at 12:00

## 2020-09-19 RX ADMIN — MESALAMINE 1000 MG: 250 CAPSULE ORAL at 09:58

## 2020-09-19 RX ADMIN — SODIUM CHLORIDE, SODIUM LACTATE, POTASSIUM CHLORIDE, AND CALCIUM CHLORIDE 125 ML/HR: 600; 310; 30; 20 INJECTION, SOLUTION INTRAVENOUS at 11:27

## 2020-09-19 RX ADMIN — SODIUM CHLORIDE 2 G: 9 INJECTION, SOLUTION INTRAVENOUS at 04:06

## 2020-09-19 NOTE — PROGRESS NOTES
0053-Stable. Resting in bed. Assessment complete. Call light and personal items within reach. Ace wrap, right lower leg to toes, intact, no drainage. Positive popliteal pulse to right lower extremity. Incentive spirometer in use. Patient has multiple red prickly raised areas noted to skin; patient states that it's a rash/reaction to the medication. Will continue to monitor. 0402-No change from initial assessment. Stable. Call light and personal items within reach. No complaints voiced. Resting quietly in bed. Ice pack refilled. 0453-Bedside and Verbal shift change report given to Keron Schmidt RN (oncoming nurse) by Ray Marroquin RN (offgoing nurse). Report included the following information SBAR and Kardex. Shift Summary:  Stable at end of shift. Resting in bed. No complaints.

## 2020-09-19 NOTE — PROGRESS NOTES
Problem: Falls - Risk of  Goal: *Absence of Falls  Description: Document Darius Mayo Fall Risk and appropriate interventions in the flowsheet. Outcome: Progressing Towards Goal  Note: Fall Risk Interventions:  Mobility Interventions: Utilize walker, cane, or other assistive device         Medication Interventions: Patient to call before getting OOB, Teach patient to arise slowly, Evaluate medications/consider consulting pharmacy    Elimination Interventions: Call light in reach    History of Falls Interventions: Evaluate medications/consider consulting pharmacy, Room close to nurse's station         Problem: Patient Education: Go to Patient Education Activity  Goal: Patient/Family Education  Outcome: Progressing Towards Goal     Problem: Risk for Spread of Infection  Goal: Prevent transmission of infectious organism to others  Description: Prevent the transmission of infectious organisms to other patients, staff members, and visitors.   Outcome: Progressing Towards Goal     Problem: Patient Education:  Go to Education Activity  Goal: Patient/Family Education  Outcome: Progressing Towards Goal     Problem: Patient Education: Go to Patient Education Activity  Goal: Patient/Family Education  Outcome: Progressing Towards Goal     Problem: Patient Education: Go to Patient Education Activity  Goal: Patient/Family Education  Outcome: Progressing Towards Goal

## 2020-09-19 NOTE — ROUTINE PROCESS
Bedside and Verbal shift change report given to Lisette Akins RN (oncoming nurse) by Francisco Barakat RN (offgoing nurse). Report included the following information SBAR and Kardex.

## 2020-09-19 NOTE — OP NOTES
[facility] [place]  OPERATIVE REPORT    Name:  Shraddha Payne  MR#:   [id number]  :  1960  ACCOUNT #:  [de-identified]  DATE OF SERVICE:  2020    PREOPERATIVE DIAGNOSIS:  Suture reaction with cellulitis. POSTOPERATIVE DIAGNOSIS:  Suture reaction with cellulitis. PROCEDURE PERFORMED:  Irrigate and debride, right ankle and foot. SURGEON:  Everton Gilliam. Arlester Fleischer, MD    ASSISTANT:  Yandy Bolanos. ANESTHESIA:  General plus Marcaine. COMPLICATIONS:  None. SPECIMENS REMOVED:  Cultures were taken. IMPLANTS:  None. ESTIMATED BLOOD LOSS:  Less than 100 mL. INDICATIONS:  The patient underwent a nerve resection from an interdigital neuroma and tarsal tunnel release about six weeks ago. For the first four weeks, her wound was doing well. She started to develop a suture abscess, which we removed the suture and then she started reacting to multiple other sutures. She developed redness and swelling. We placed her on oral clindamycin. She started feeling like she is having a reaction to that. We placed her on doxycycline, took cultures. She grew Pseudomonas and all of the oral antibiotics, this was sensitive to, she is allergic to. Therefore, we planned on washing her out and placing her on IV antibiotics. She was brought to the hospital.  Infectious Disease consulted. Plan on meropenem for 10-14 days. She was marked. PROCEDURE:  She was taken to the operating room and after general anesthesia, supine on the operating table, padded appropriately. The time-out showed the correct limb and correct patient, reviewed her exam and after reviewing her time-out, we made a dorsal incision over the third webspace sharply down to skin ellipsing out the sutures and found some deep Vicryl sutures. No signs of deep pocket of abscess, this was all washed out and then closed with nylon sutures with no deep sutures. We then went to the medial side, there was obvious purulence which was cultured. We then ellipsed out the previous incision. There was a track coming out from deep, which as we followed this down found it to be a suture, all around surrounded by hematoma. We removed this suture, washed this out, ellipsed out the poor quality skin. We then irrigated both wounds with IrriSept, which is the CHG dilute solution. We washed both wounds out with about a liter of this and then curetted the medial wound again and then closed it with vertical mattress and simple 2-0 nylon sutures once we had stable edges. There was an area of poor quality tissue, but there were no exposed veins or nerves or tendons. This did not appear to track the bone. We washed this all out, closed it with nylon as noted above, placed Xeroform over the wound and placed her in a posterior splint. She was extubated and transferred to recovery room. She was admitted overnight to setup IV antibiotics, PICC line and Home Health.       Percy Westbrook MD      [id number]  D:  09/18/2020 13:02  T:  09/19/2020 0:02  JOB #:  [id number]

## 2020-09-19 NOTE — ROUTINE PROCESS
Bedside and Verbal shift change report given to Aisha Favre, RN (oncoming nurse) by Rogelio Chacko RN (offgoing nurse). Report included the following information SBAR and Kardex.

## 2020-09-19 NOTE — PROGRESS NOTES
Observation notice provided in writing to patient and/or caregiver as well as verbal explanation of the policy. Patients who are in outpatient status also receive the Observation notice. Met with patient to discuss ordered home infusion meropenem 1 g q 8 hrs as well as to coordinate with [facility] the sequencing of therapy. Per [facility] pharmacist, [facility] cannot start therapy for nursing care; [facility] can start therapy in a.m. tomorrow morning; patient will remain at [facility] for noon and 8p dose of meropenem and  [facility] will coordinate having med available for a.m. dose. Patient verbalized understanding of discharge this evening after pm dose of abx. Referral placed and discussed with [facility] and all info for medication and PICC report faxed to Marie Saravia and placed in [facility].

## 2020-09-19 NOTE — DISCHARGE SUMMARY
DISCHARGE SUMMARY    Patient: Brittany Kaufman MRN: [id number]  CSN: [id number]    YOB: 1960  Age: 64 y.o. Sex: female              Admit Date: 2020    Discharge Date: 20    Admission Diagnoses: Cellulitis and abscess of foot [L03.119, L02.619]    Discharge Diagnoses:    Problem List as of 2020 Date Reviewed: 2020          Codes Class Noted - Resolved    Cellulitis and abscess of foot ICD-10-CM: L03.119, L02.619  ICD-9-CM: 682.7  2020 - Present        * (Principal) Incisional abscess ICD-10-CM: T81.49XA  ICD-9-CM: 998.59  2020 - Present        Rotator cuff impingement syndrome of left shoulder (Chronic) ICD-10-CM: M75.42  ICD-9-CM: 726.10  2019 - Present        Rectocele ICD-10-CM: N81.6  ICD-9-CM: 618.04  3/2/2018 - Present        Female cystocele ICD-10-CM: N81.10  ICD-9-CM: 618.01  2017 - Present              Discharge Condition: Stable    Hospital Course: On the day of admission the patient underwent a right foot irrigation, debridement and cultures. She has tolerated the IV merepenem but did have a rash to the dilaudid. As long as they are orthopaedically and medically stable they will be discharged to home [facility] after noon dose of antibiotics. Patients hemoglobins were   Recent Labs     20  1412   HGB 13.0   . Patient temp max was. Temp (72hrs), Av.4 °F (36.3 °C), Min:97 °F (36.1 °C), Max:98.6 °F (37 °C)  . Discharge Medications:     Current Discharge Medication List      START taking these medications    Details   acetaminophen (TYLENOL) 325 mg tablet Take 2 Tabs by mouth every four (4) hours as needed for Pain or Fever. Qty: 50 Tab, Refills: 0      0.9% sodium chloride solp 100 mL with meropenem 1 gram solr 2 g 2 g by IntraVENous route every eight (8) hours. Qty: 30 Dose, Refills: 0      HYDROcodone-acetaminophen (NORCO) 5-325 mg per tablet Take 1-2 Tabs by mouth every four (4) hours as needed for Pain for up to 3 days.  Max Daily Amount: 12 Tabs. Qty: 20 Tab, Refills: 0    Associated Diagnoses: Incisional abscess         CONTINUE these medications which have NOT CHANGED    Details   doxycycline (VIBRAMYCIN) 100 mg capsule two (2) times a day. Omeprazole delayed release (PRILOSEC D/R) 20 mg tablet Take 20 mg by mouth daily. mesalamine (PENTASA) 500 mg CR capsule Take 1,000 mg by mouth three (3) times daily. levothyroxine (SYNTHROID) 112 mcg tablet Take 112 mcg by mouth Daily (before breakfast). levocetirizine (XYZAL) 5 mg tablet Take 5 mg by mouth nightly. fexofenadine (ALLEGRA) 180 mg tablet Take 180 mg by mouth daily. folic acid (FOLVITE) 1 mg tablet Take 1 mg by mouth daily. estradiol (ESTROGEL) 1.25 gram/actuation glpm 1 Dose by TransDERmal route daily. temazepam (RESTORIL) 15 mg capsule Take 15 mg by mouth nightly. ascorbic acid/collagen hydr (COLLAGEN PLUS VITAMIN C PO) Take  by mouth daily. naloxone (NARCAN) 4 mg/actuation nasal spray Use 1 spray intranasally, then discard. Repeat with new spray every 2 min as needed for opioid overdose symptoms, alternating nostrils. Qty: 1 Each, Refills: 0      vitamin E (AQUA GEMS) 400 unit capsule Take 400 Units by mouth daily. triamterene-hydroCHLOROthiazide (DYAZIDE) 37.5-25 mg per capsule Take 1 Cap by mouth daily. diclofenac (VOLTAREN) 1 % gel Apply 2 g to affected area four (4) times daily as needed. infliximab (REMICADE IV) by IntraVENous route every six (6) weeks. methotrexate (RHEUMATREX) 2.5 mg tablet Take 20 mg by mouth Every Friday. albuterol (PROAIR HFA) 90 mcg/actuation inhaler Take 2 Puffs by inhalation every four (4) hours as needed for Wheezing. EPINEPHrine (EPIPEN) 0.3 mg/0.3 mL injection 0.3 mg by IntraMUSCular route once as needed. hydrOXYzine HCl (ATARAX) 25 mg tablet Take 25 mg by mouth three (3) times daily as needed for Itching. potassium 99 mg tablet Take 99 mg by mouth daily. cholecalciferol (VITAMIN D3) 1,000 unit cap Take 5,000 Units by mouth daily. Activity: non weight bearing right foot    Diet: Regular Diet    Wound Care: Keep wound clean and dry and Reinforce dressing PRN    Follow-up as scheduled this week.

## 2020-09-19 NOTE — PROGRESS NOTES
No changes from assessment by LALA Wiseman RN. Dressing to LLE CDI. Patient able to wiggle toes, unable to palpate pulses due to bulky dressing. Patient denies pain or discomfort at this time. Able to move independently with use of scooter. 0740 - Bedside and Written shift change report given to LACY Hodge RN (oncoming nurse) by Evelyn Marino (offgoing nurse). Report included the following information SBAR, Kardex, Intake/Output and MAR.

## 2020-09-19 NOTE — PROGRESS NOTES
Problem: Mobility Impaired (Adult and Pediatric)  Goal: *Acute Goals and Plan of Care (Insert Text)  Description: Physical Therapy Goals  Initiated 9/18/2020  to be met within 1-2 days  STG's:  1. Bed mobility:  Supine to/from sit with S in prep for ADL activity. 2. Transfers:  Sit to/from stand with S/knee scooter in prep for gait. LTG's  1. Ambulation:  Mobilize self 50 ft. Or greater with S/knee scooter maintaining NWB R LE for home mobility at discharge. Note:   PHYSICAL THERAPY TREATMENT/DISCHARGE    Patient: Neeraj Morfin (59 y.o. female)  Date: 9/19/2020  Diagnosis: Cellulitis and abscess of foot [L03.119, L02.619]   Incisional abscess  Procedure(s) (LRB):  IRRIGATE AND DEBRIDEMENT RIGHT ANKLE AND FOOT (Right) 1 Day Post-Op  Precautions: Fall, NWB  Chart, physical therapy assessment, plan of care and goals were reviewed. ASSESSMENT:  Pt seen and evaluated by PT yesterday. Pt tolerating mobility well. Pt has been NWB for 8 weeks at home and thoroughly discussed her home set up and her way of negotiating her household. Pt amb 50 ft with knee scooter with supervision. No LOB or unsteadiness with mobility. Pt  at home to assist as needed. Pt with red spots/reaction on L entire arm, increased during session. RN aware. Pt has no needs for skilled PT services at this time. Progression toward goals:  [x]      Goals met  []      Improving appropriately and progressing toward goals  []      Improving slowly and progressing toward goals  []      Not making progress toward goals and plan of care will be adjusted     PLAN:  Patient will be discharged from physical therapy at this time. Rationale for discharge:  [x] Goals Achieved  [] [address]  [] Patient not participating in therapy  [] Other:  Discharge Recommendations:  Home Health vs None  Further Equipment Recommendations for Discharge:  N/A     SUBJECTIVE:   Patient stated I am itchy.     OBJECTIVE DATA SUMMARY:   Critical Behavior:  Neurologic State: Appropriate for age  Orientation Level: Appropriate for age  Cognition: Appropriate decision making, Appropriate for age attention/concentration, Appropriate safety awareness  Safety/Judgement: Awareness of environment, Good awareness of safety precautions  Functional Mobility Training:  Bed Mobility:   Supine to Sit: Modified independent  Sit to Supine: Modified independent  Scooting: Stand-by assistance  Transfers:  Sit to Stand: Supervision  Stand to Sit: Supervision  Balance:  Sitting: Intact  Standing: Intact; With support  Ambulation/Gait Training:  Distance (ft): 50 Feet (ft)  Assistive Device: Gait belt(knee scooter)  Ambulation - Level of Assistance: Supervision  Gait Abnormalities: (knee scooter)  Right Side Weight Bearing: Non-weight bearing   Base of Support: Shift to left  Pain:  Pain Scale 1: Numeric (0 - 10)  Pain Intensity 1: 5  Pain Location 1: Ankle; Foot  Pain Orientation 1: Anterior; Lateral  Pain Description 1: Burning; Heaviness  Pain Intervention(s) 1: Position;Ice;Rest  Activity Tolerance:   Good  Please refer to the flowsheet for vital signs taken during this treatment.   After treatment:   [] Patient left in no apparent distress sitting up in chair  [x] Patient left in no apparent distress in bed  [x] Call bell left within reach  [x] Nursing notified  [] Caregiver present  [] Bed alarm activated  Julio Ocasio   Time Calculation: 43 mins

## 2020-09-19 NOTE — PROGRESS NOTES
Problem: Falls - Risk of  Goal: *Absence of Falls  Description: Document Jan Mayfield Fall Risk and appropriate interventions in the flowsheet.   Outcome: Progressing Towards Goal  Note: Fall Risk Interventions:  Mobility Interventions: Utilize walker, cane, or other assistive device         Medication Interventions: Patient to call before getting OOB, Teach patient to arise slowly    Elimination Interventions: Call light in reach, Patient to call for help with toileting needs    History of Falls Interventions: Consult care management for discharge planning, Investigate reason for fall         Problem: Nausea/Vomiting (Adult)  Goal: *Absence of nausea/vomiting  Outcome: Progressing Towards Goal

## 2020-09-19 NOTE — PROGRESS NOTES
OCCUPATIONAL THERAPY EVALUATION/DISCHARGE  Initial Occupational Therapy Goals (9/19/2020) Within 7 day(s):    1. Patient will perform perform grooming standing sink level while maintaining NWB precautions for 5 minutes for increased independence in ADLs. 2. Patient will perform UB dressing with mod I seated EOB for increased independence with ADLs. 3. Patient will perform LB dressing with mod I & A/E PRN for increased independence with ADLs. 4. Patient will perform all aspects of toileting with I for increased independence with ADLs. 5. Patient will perform LE ADLs utilizing body mechanics & adaptive strategies with 1 verbal cue for increased safety in ADLs. 6. Patient will independently apply energy conservation techniques with 1 verbal cue(s) for increased independence with ADLs. Patient: Stephanie Hardy (89 y.o. female)  Date: 9/19/2020  Primary Diagnosis: Cellulitis and abscess of foot [L03.119, L02.619]  Procedure(s) (LRB):  IRRIGATE AND DEBRIDEMENT RIGHT ANKLE AND FOOT (Right) 1 Day Post-Op   Precautions:   Fall, NWB  PLOF: Pt had been NWB for past 8 weeks had been living mod I with help at home, using knee scooter for mobilty, has necessary ADL equipment, prior to surgery/infection was completing ADL/IADLs independently. ASSESSMENT AND RECOMMENDATIONS:  Based on the objective data described below, the patient presents with decreased independence in ADL with limitations due to weight bearing status, weakness, and pain. Pt had been performing self care past 8 weeks with mod I using knee scooter. Received supine in bed, able to perform bed mobility, use knee scooter to get to bathroom, perform bathroom mobility and toileting with mod I. Able to state precautions and how she compensates self care at home. She is left supine in bed with knee propped, on pillows.  Recommend home health     Education: Pt educated on role of OT in acute setting, safe compensatory techniques during self care to maintain NWB status. Skilled Occupational Therapy is not indicated at this time. Discharge Recommendations: Home Health  Further Equipment Recommendations for Discharge: N/A      SUBJECTIVE:   Patient stated Jose Rafael Ar been doing this for past 2 months.     OBJECTIVE DATA SUMMARY:     Past Medical History:   Diagnosis Date    Acute sinusitis     Adverse effect of anesthesia     hard to awaken    Ankylosing spondylitis ([address]     Asthma     sports and allergy induced    Baker's cyst     knees    Carpal tunnel syndrome     Crohn disease ([address]     Deep venous thrombosis ([address] 2009    right leg    Diverticulitis     Edema     while sitting long periors    Tammi-Danlos syndrome     Endometriosis     Fibromyalgia     GERD (gastroesophageal reflux disease)     Strandquist's disease     Heart burn     Hiatal hernia     Hypothyroid     goiter hashimoto    IBD (inflammatory bowel disease)     Irregular heart beat     Lumbar degenerative disc disease     lumbar disc displacement    Lumbosacral spondylosis     Nausea & vomiting     wants IV as the oral did not keep her from multi vomitins    Osteoarthritis     Rectocele     Recurrent boils     Rheumatoid arthritis (HCC)     SOBOE (shortness of breath on exertion)     SVT (supraventricular tachycardia) ([address] 2019    TMJ (dislocation of temporomandibular joint)     Ulcerative colitis ([address]     Ulcerative colitis ([address]     [place] Parkinson White pattern seen on electrocardiogram     Raffy-Parkinson-White (WPW) pattern 2019     Past Surgical History:   Procedure Laterality Date    CARDIAC SURG PROCEDURE UNLIST  11/29/2019    svt ablation    HX BREAST REDUCTION Bilateral 11/15/2018    HX GYN      rectocele repair x 3    HX GYN  2020    labialoplasty with vaginal scar     HX HEENT      Septoplasty    HX HEENT      esophageal stretch *10    HX HYSTERECTOMY  1995    HX ORTHOPAEDIC Right 1997    bunionectomy, lujan's neuroma    HX ORTHOPAEDIC Right 2014    Thumb surgery    HX OTHER SURGICAL  2019    Scar revision    HX SHOULDER ARTHROSCOPY Left 2019    HX TONSILLECTOMY  1994    HX UROLOGICAL  08/30/2007, 2020    cystoscopy , ureteral stent    HX UROLOGICAL      bladder repair    HX UROLOGICAL  2005    Posterior repair, rectocele repair    HX UROLOGICAL  2016    cystoscopy    HX UROLOGICAL  2017    cystocelectomy    HX UROLOGICAL  03/2018    cystocelectomy     Barriers to Learning/Limitations: None  Compensate with: visual, verbal, tactile, kinesthetic cues/model    Home Situation: Tri level property   Home Situation  Home Environment: Private residence  # Steps to Enter: 6(3+3)  Rails to Enter: Yes  Hand Rails : Bilateral  One/Two Story Residence: Other (Comment)(tri-level with 6 steps to each level)  # of Interior Steps: 6  Living Alone: No  Support Systems: Spouse/Significant Other/Partner, Family member(s)  Patient Expects to be Discharged to[de-identified] Private residence  Current DME Used/Available at Home: Grab bars, Adaptive bathing aides, Adaptive dressing aides, Shower chair(Scooter )  Tub or Shower Type: Tub/Shower combination  [x]     Right hand dominant   []     Left hand dominant    Cognitive/Behavioral Status:  Neurologic State: Appropriate for age  Orientation Level: Appropriate for age  Cognition: Appropriate decision making; Appropriate for age attention/concentration; Appropriate safety awareness  Safety/Judgement: Awareness of environment;Good awareness of safety precautions    Skin: dressing in place, wrapped with ACE bandage  Edema: noted through LE    Coordination: BUE  Coordination: Within functional limits  Fine Motor Skills-Upper: Left Intact; Right Intact    Gross Motor Skills-Upper: Left Intact; Right Intact    Balance:  Sitting: Intact  Standing: Intact; With support    Strength: BUE  Strength:  Within functional limits    Tone & Sensation: BUE  Tone: Normal  Sensation: Intact      Range of Motion: BUE  AROM: Generally decreased, functional    Functional Mobility and Transfers for ADLs:  Bed Mobility: Supine to Sit: Stand-by assistance  Sit to Supine: Stand-by assistance  Scooting: Stand-by assistance  Transfers:  Sit to Stand: Stand-by assistance     Toilet Transfer : Modified independent      Bathroom Mobility: Modified independent    ADL Assessment:  Feeding: Modified independent    Oral Facial Hygiene/Grooming: Modified Independent    Bathing: Contact guard assistance    Upper Body Dressing: Modified independent    Lower Body Dressing: Contact guard assistance    Toileting: Modified independent      ADL Intervention:    Grooming  Grooming Assistance: Modified independent  Position Performed: Standing  Washing Hands: Modified independent  Brushing Teeth: Modified independent  Brushing/Combing Hair: Modified independent    Lower Body Dressing Assistance  Dressing Assistance: Stand-by assistance  Socks: Stand-by assistance  Position Performed: Seated edge of bed    Toileting  Toileting Assistance: Modified independent  Bladder Hygiene: Modified independent  Bowel Hygiene: Modified indpendent    Cognitive Retraining  Safety/Judgement: Awareness of environment;Good awareness of safety precautions      Pain:  Pain level pre-treatment: 4/10   Pain level post-treatment: 4/10   Pain Intervention(s): Medication provided by Nursing (see MAR); Rest, Ice, Repositioning   Response to intervention: Nurse notified, See doc flow sheet    Activity Tolerance:   Good. Patient able to stand with NWB status and leg scooter for 10 minute(s). Patient able to complete ADLs without rest breaks. Patient limited by weight bearing status and pain . Patient performs safe mobility using the scooter in bathroom and around room     Please refer to the flowsheet for vital signs taken during this treatment.   After treatment:   []  Patient left in no apparent distress sitting up in chair  [x]  Patient left in no apparent distress in bed  [x]  Call bell left within reach  [x]  Nursing notified  []  Caregiver present  []  Bed alarm activated    COMMUNICATION/EDUCATION:   [x]      Role of Occupational Therapy in the acute care setting  [x]      Home safety education was provided and the patient/caregiver indicated understanding. [x]      Patient/family have participated as able and agree with findings and recommendations. []      Patient is unable to participate in plan of care at this time. Thank you for this referral.  Elke MEAD, OTR/L   Time Calculation: 23 mins      Eval Complexity: History: MEDIUM Complexity : Expanded review of history including physical, cognitive and psychosocial  history ; Examination: LOW Complexity : 1-3 performance deficits relating to physical, cognitive , or psychosocial skils that result in activity limitations and / or participation restrictions ;    Decision Making:LOW Complexity : No comorbidities that affect functional and no verbal or physical assistance needed to complete eval tasks

## 2020-09-19 NOTE — VIRTUAL HEALTH
Brief Meropenem note    IF she is tolerating the meropenem well enough, ok to send home on same:    Meropenem 1gm IV q8h - terminate 9/25 with PICC removal that day  Labs on Monday - CBC with diff and BMP. No f/u me necessary.     Carlito Irizarry MD  Cell [phone]  [facility]

## 2020-09-19 NOTE — PROGRESS NOTES
Bedside and verbal report given to OMARI Aranda RN (oncoming nurse) by KESHIA Blackburn  (off going nurse). Report included the following information SBAR, Kardex, OR Summary, Intake/Output, and MAR. Pt's pain was treated an controlled per MAR. Pt complained of itching and hive after administration of IV Dilaudid, Atarax given. Pt complained of nausea and 1 emesis episode, Phenergan given per MAR. Pt ambulates to the bathroom with her scooter with no issues. No acute changes, NAD. Bedside and verbal report given by (off going nurse) OMARI Aranda RN to (oncoming nurse) La Gomez RN. Report included the following information SBAR, Kardex, OR Summary, Intake/Output, and MAR.

## 2020-09-19 NOTE — PROGRESS NOTES
0730 Bedside shift report given by Delaney España RN. Report included the following information SBAR, Kardex, Intake/Output, MAR and Recent Results. 1140 Paged Dr. Karen Newman as patient is requesting Benadryl for hives on arms and chest.    1150 Received telephone order with read back from Dr. Ko Mathis for Benadryl 25-50 mg PRN Q6.    2205 Encouraged pt to order lunch. 1800 Pt informed me that [facility] had not delivered her home med yet and wanted to speak to [facility]. I paged [facility] to see if she had any insight. 400 [facility] bedside shift report to Garrett Magana RN. Report included the following information SBAR, Kardex, Intake/Output, MAR and Recent Results.

## 2020-09-20 ENCOUNTER — HOME CARE VISIT (OUTPATIENT)
Dept: SCHEDULING | Facility: HOME HEALTH | Age: 60
End: 2020-09-20
Payer: COMMERCIAL

## 2020-09-20 VITALS
SYSTOLIC BLOOD PRESSURE: 100 MMHG | HEIGHT: 63 IN | OXYGEN SATURATION: 100 % | HEART RATE: 65 BPM | TEMPERATURE: 97.8 F | DIASTOLIC BLOOD PRESSURE: 58 MMHG | BODY MASS INDEX: 39.04 KG/M2 | WEIGHT: 220.31 LBS | RESPIRATION RATE: 15 BRPM

## 2020-09-20 LAB
BACTERIA SPEC CULT: NORMAL
SERVICE CMNT-IMP: NORMAL

## 2020-09-20 PROCEDURE — 400013 HH SOC

## 2020-09-20 PROCEDURE — G0299 HHS/HOSPICE OF RN EA 15 MIN: HCPCS

## 2020-09-20 NOTE — PROGRESS NOTES
Shift summary  Received patient alert and oriented on room air,prn oxycodone given for pain and morepenem given. Discharged home with the  after reviewing all the discharge instruction including medication list.  Patient Vitals for the past 12 hrs:   Temp Pulse Resp BP   09/19/20 1933 97.8 °F (36.6 °C) 65 15 (!) 100/58

## 2020-09-21 ENCOUNTER — HOSPITAL ENCOUNTER (OUTPATIENT)
Dept: LAB | Age: 60
Discharge: HOME | End: 2020-09-21
Payer: COMMERCIAL

## 2020-09-21 ENCOUNTER — HOME CARE VISIT (OUTPATIENT)
Dept: SCHEDULING | Facility: HOME HEALTH | Age: 60
End: 2020-09-21
Payer: COMMERCIAL

## 2020-09-21 VITALS
HEART RATE: 71 BPM | DIASTOLIC BLOOD PRESSURE: 78 MMHG | SYSTOLIC BLOOD PRESSURE: 107 MMHG | RESPIRATION RATE: 17 BRPM | TEMPERATURE: 97.7 F | OXYGEN SATURATION: 99 %

## 2020-09-21 LAB
ANION GAP SERPL CALC-SCNC: 6 MMOL/L (ref 3–18)
BASOPHILS # BLD: 0 K/UL (ref 0–0.1)
BASOPHILS NFR BLD: 0 % (ref 0–2)
BUN SERPL-MCNC: 15 MG/DL (ref 7–18)
BUN/CREAT SERPL: 18 (ref 12–20)
CALCIUM SERPL-MCNC: 8.5 MG/DL (ref 8.5–10.1)
CHLORIDE SERPL-SCNC: 105 MMOL/L (ref 100–111)
CO2 SERPL-SCNC: 30 MMOL/L (ref 21–32)
CREAT SERPL-MCNC: 0.83 MG/DL (ref 0.6–1.3)
DIFFERENTIAL METHOD BLD: ABNORMAL
EOSINOPHIL # BLD: 0.2 K/UL (ref 0–0.4)
EOSINOPHIL NFR BLD: 2 % (ref 0–5)
ERYTHROCYTE [DISTWIDTH] IN BLOOD BY AUTOMATED COUNT: 15 % (ref 11.6–14.5)
GLUCOSE SERPL-MCNC: 67 MG/DL (ref 74–99)
HCT VFR BLD AUTO: 37.5 % (ref 35–45)
HGB BLD-MCNC: 11.8 G/DL (ref 12–16)
LYMPHOCYTES # BLD: 2.5 K/UL (ref 0.9–3.6)
LYMPHOCYTES NFR BLD: 32 % (ref 21–52)
MCH RBC QN AUTO: 30.2 PG (ref 24–34)
MCHC RBC AUTO-ENTMCNC: 31.5 G/DL (ref 31–37)
MCV RBC AUTO: 95.9 FL (ref 74–97)
MONOCYTES # BLD: 0.4 K/UL (ref 0.05–1.2)
MONOCYTES NFR BLD: 6 % (ref 3–10)
NEUTS SEG # BLD: 4.5 K/UL (ref 1.8–8)
NEUTS SEG NFR BLD: 60 % (ref 40–73)
PLATELET # BLD AUTO: 167 K/UL (ref 135–420)
PMV BLD AUTO: 10.2 FL (ref 9.2–11.8)
POTASSIUM SERPL-SCNC: 3.8 MMOL/L (ref 3.5–5.5)
RBC # BLD AUTO: 3.91 M/UL (ref 4.2–5.3)
SODIUM SERPL-SCNC: 141 MMOL/L (ref 136–145)
WBC # BLD AUTO: 7.6 K/UL (ref 4.6–13.2)

## 2020-09-21 PROCEDURE — 85025 COMPLETE CBC W/AUTO DIFF WBC: CPT

## 2020-09-21 PROCEDURE — 80048 BASIC METABOLIC PNL TOTAL CA: CPT

## 2020-09-21 PROCEDURE — G0299 HHS/HOSPICE OF RN EA 15 MIN: HCPCS

## 2020-09-22 ENCOUNTER — HOME CARE VISIT (OUTPATIENT)
Dept: HOME HEALTH SERVICES | Facility: HOME HEALTH | Age: 60
End: 2020-09-22
Payer: COMMERCIAL

## 2020-09-22 LAB
BACTERIA SPEC CULT: ABNORMAL
BACTERIA SPEC CULT: ABNORMAL
FAX TO INFO,FAXT: NORMAL
FAX TO NUMBER,FAXN: NORMAL
GRAM STN SPEC: ABNORMAL
GRAM STN SPEC: ABNORMAL
SERVICE CMNT-IMP: ABNORMAL

## 2020-09-25 ENCOUNTER — HOME CARE VISIT (OUTPATIENT)
Dept: HOME HEALTH SERVICES | Facility: HOME HEALTH | Age: 60
End: 2020-09-25
Payer: COMMERCIAL

## 2020-09-28 ENCOUNTER — HOME CARE VISIT (OUTPATIENT)
Dept: SCHEDULING | Facility: HOME HEALTH | Age: 60
End: 2020-09-28
Payer: COMMERCIAL

## 2020-09-28 ENCOUNTER — HOSPITAL ENCOUNTER (OUTPATIENT)
Dept: LAB | Age: 60
Discharge: HOME | End: 2020-09-28
Payer: COMMERCIAL

## 2020-09-28 LAB
ANION GAP SERPL CALC-SCNC: 8 MMOL/L (ref 3–18)
BASOPHILS # BLD: 0 K/UL (ref 0–0.1)
BASOPHILS NFR BLD: 1 % (ref 0–2)
BUN SERPL-MCNC: 17 MG/DL (ref 7–18)
BUN/CREAT SERPL: 20 (ref 12–20)
CALCIUM SERPL-MCNC: 8.8 MG/DL (ref 8.5–10.1)
CHLORIDE SERPL-SCNC: 103 MMOL/L (ref 100–111)
CO2 SERPL-SCNC: 28 MMOL/L (ref 21–32)
CREAT SERPL-MCNC: 0.83 MG/DL (ref 0.6–1.3)
DIFFERENTIAL METHOD BLD: ABNORMAL
EOSINOPHIL # BLD: 0.1 K/UL (ref 0–0.4)
EOSINOPHIL NFR BLD: 2 % (ref 0–5)
ERYTHROCYTE [DISTWIDTH] IN BLOOD BY AUTOMATED COUNT: 14.5 % (ref 11.6–14.5)
FAX TO INFO,FAXT: NORMAL
FAX TO NUMBER,FAXN: NORMAL
GLUCOSE SERPL-MCNC: 88 MG/DL (ref 74–99)
HCT VFR BLD AUTO: 38.7 % (ref 35–45)
HGB BLD-MCNC: 12.2 G/DL (ref 12–16)
LYMPHOCYTES # BLD: 2 K/UL (ref 0.9–3.6)
LYMPHOCYTES NFR BLD: 31 % (ref 21–52)
MCH RBC QN AUTO: 30 PG (ref 24–34)
MCHC RBC AUTO-ENTMCNC: 31.5 G/DL (ref 31–37)
MCV RBC AUTO: 95.1 FL (ref 74–97)
MONOCYTES # BLD: 0.5 K/UL (ref 0.05–1.2)
MONOCYTES NFR BLD: 7 % (ref 3–10)
NEUTS SEG # BLD: 4 K/UL (ref 1.8–8)
NEUTS SEG NFR BLD: 59 % (ref 40–73)
PLATELET # BLD AUTO: 188 K/UL (ref 135–420)
PMV BLD AUTO: 10.2 FL (ref 9.2–11.8)
POTASSIUM SERPL-SCNC: 3.3 MMOL/L (ref 3.5–5.5)
RBC # BLD AUTO: 4.07 M/UL (ref 4.2–5.3)
SODIUM SERPL-SCNC: 139 MMOL/L (ref 136–145)
WBC # BLD AUTO: 6.5 K/UL (ref 4.6–13.2)

## 2020-09-28 PROCEDURE — 85025 COMPLETE CBC W/AUTO DIFF WBC: CPT

## 2020-09-28 PROCEDURE — 80048 BASIC METABOLIC PNL TOTAL CA: CPT

## 2020-09-28 PROCEDURE — G0299 HHS/HOSPICE OF RN EA 15 MIN: HCPCS

## 2020-10-02 ENCOUNTER — HOME CARE VISIT (OUTPATIENT)
Dept: SCHEDULING | Facility: HOME HEALTH | Age: 60
End: 2020-10-02
Payer: COMMERCIAL

## 2020-10-02 PROCEDURE — G0299 HHS/HOSPICE OF RN EA 15 MIN: HCPCS

## 2020-10-07 ENCOUNTER — HOSPITAL ENCOUNTER (EMERGENCY)
Age: 60
Discharge: HOME | End: 2020-10-07
Attending: EMERGENCY MEDICINE
Payer: COMMERCIAL

## 2020-10-07 ENCOUNTER — HOME HEALTH ADMISSION (OUTPATIENT)
Dept: HOME HEALTH SERVICES | Facility: HOME HEALTH | Age: 60
End: 2020-10-07
Payer: COMMERCIAL

## 2020-10-07 ENCOUNTER — APPOINTMENT (OUTPATIENT)
Dept: VASCULAR SURGERY | Age: 60
End: 2020-10-07
Attending: EMERGENCY MEDICINE
Payer: COMMERCIAL

## 2020-10-07 VITALS
BODY MASS INDEX: 38.45 KG/M2 | TEMPERATURE: 97.8 F | HEIGHT: 63 IN | DIASTOLIC BLOOD PRESSURE: 91 MMHG | OXYGEN SATURATION: 100 % | SYSTOLIC BLOOD PRESSURE: 138 MMHG | WEIGHT: 217 LBS | HEART RATE: 102 BPM | RESPIRATION RATE: 16 BRPM

## 2020-10-07 DIAGNOSIS — T81.49XA POSTOPERATIVE WOUND INFECTION: ICD-10-CM

## 2020-10-07 DIAGNOSIS — M25.561 ARTHRALGIA OF RIGHT LOWER LEG: Primary | ICD-10-CM

## 2020-10-07 PROCEDURE — 93971 EXTREMITY STUDY: CPT

## 2020-10-07 PROCEDURE — 99283 EMERGENCY DEPT VISIT LOW MDM: CPT

## 2020-10-07 RX ORDER — DOXYCYCLINE HYCLATE 100 MG
100 TABLET ORAL 2 TIMES DAILY
Qty: 20 TAB | Refills: 0 | Status: SHIPPED | OUTPATIENT
Start: 2020-10-07 | End: 2020-10-17

## 2020-10-07 NOTE — ED TRIAGE NOTES
Patient reports has throbbing sensation up left leg and left foot.  Hx of clots  Had surgery o 07/23/2020

## 2020-10-07 NOTE — ED PROVIDER NOTES
EMERGENCY DEPARTMENT HISTORY AND PHYSICAL EXAM    Date: 10/7/2020  Patient Name: Brain Duverney    History of Presenting Illness     Chief Complaint   Patient presents with    Leg Pain         History Provided By: Patient    Chief Complaint: Right leg pain      Additional History (Context):   3:16 PM  Brain Duverney is a 64 y.o. female with PMHX ulcerative colitis, DVT, Raffy-Parkinson-White, Ehler Danlos, rheumatoid arthritis presents to the emergency department C/O right lower extremity pain that began yesterday. Patient had ankle surgery in July with postoperative wound dehiscence and infection which required additional surgery. She was initially started on doxycycline and then was switched to meropenem via PICC line on September 18 when she had the revision surgery. She just finished that medication a week ago. She was taking heparin which she also finished a week ago. She denies fevers. No chest pain or shortness of breath. She had noticed some increasing redness around the surgical site 4 days ago. Patient does take methotrexate and was taking a biologic but has been instructed to hold off on taking that medication until seen by her rheumatologist    PCP: Peter Giordano MD    Current Outpatient Medications   Medication Sig Dispense Refill    doxycycline (VIBRA-TABS) 100 mg tablet Take 1 Tab by mouth two (2) times a day for 10 days. 20 Tab 0    fluconazole (DIFLUCAN) 200 mg tablet Take 200 mg by mouth two (2) times a week.  ketoconazole (NIZORAL) 2 % topical cream Apply 1 Each to affected area two (2) times a day.  sodium chloride (Normal Saline Flush) 5-10 mL by IntraVENous route daily. flush IV catheter with 10ml before and after infusing each dose of medication as directed.  heparin, porcine, pf (heparin LockFlush,Porcine,,PF,) 10 unit/mL injection 30 Units by IntraVENous route daily.  flush IV catheter with 3ml of heparin 10units/mL after the last dose of 0.9% sodium chloride flush, after each dose of medication or as directed. flush IV catheter every day if not in use.  ondansetron (ZOFRAN ODT) 4 mg disintegrating tablet Take 4 mg by mouth every eight (8) hours as needed for Nausea or Vomiting.  Esomeprazole Magnesium Take 20 mg by mouth daily.  glucosam-chond-hyalu-CF borate (Move Free [employer] sentronics) 750 mg-100 mg- 1.65 mg-108 mg tab Take 1 Tab by mouth daily.  diphenhydrAMINE-acetaminophen (Tylenol PM Extra Strength)  mg tab Take 2 Tabs by mouth nightly as needed for Pain.  acetaminophen (Tylenol Arthritis Pain) 650 mg TbER Take 1,300 mg by mouth two (2) times daily as needed for Pain.  acetaminophen (TYLENOL) 325 mg tablet Take 2 Tabs by mouth every four (4) hours as needed for Pain or Fever. (Patient not taking: Reported on 9/21/2020) 50 Tab 0    0.9% sodium chloride solp 100 mL with meropenem 1 gram solr 2 g 2 g by IntraVENous route every eight (8) hours. 30 Dose 0    ascorbic acid/collagen hydr (COLLAGEN PLUS VITAMIN C PO) Take  by mouth daily.  naloxone (NARCAN) 4 mg/actuation nasal spray Use 1 spray intranasally, then discard. Repeat with new spray every 2 min as needed for opioid overdose symptoms, alternating nostrils. 1 Each 0    Omeprazole delayed release (PRILOSEC D/R) 20 mg tablet Take 20 mg by mouth daily.  vitamin E (AQUA GEMS) 400 unit capsule Take 400 Units by mouth daily.  mesalamine (PENTASA) 500 mg CR capsule Take 1,000 mg by mouth three (3) times daily.  triamterene-hydroCHLOROthiazide (DYAZIDE) 37.5-25 mg per capsule Take 1 Cap by mouth daily.  diclofenac (VOLTAREN) 1 % gel Apply 2 g to affected area four (4) times daily as needed.  levothyroxine (SYNTHROID) 112 mcg tablet Take 112 mcg by mouth Daily (before breakfast).  levocetirizine (XYZAL) 5 mg tablet Take 5 mg by mouth nightly.  fexofenadine (ALLEGRA) 180 mg tablet Take 180 mg by mouth daily.       infliximab (REMICADE IV) by IntraVENous route every six (6) weeks.  methotrexate (RHEUMATREX) 2.5 mg tablet Take 20 mg by mouth Every Friday.  folic acid (FOLVITE) 1 mg tablet Take 1 mg by mouth daily.  estradiol (ESTROGEL) 1.25 gram/actuation glpm 1 Dose by TransDERmal route daily.  temazepam (RESTORIL) 15 mg capsule Take 15 mg by mouth nightly.  albuterol (PROAIR HFA) 90 mcg/actuation inhaler Take 2 Puffs by inhalation every four (4) hours as needed for Wheezing.  EPINEPHrine (EPIPEN) 0.3 mg/0.3 mL injection 0.3 mg by IntraMUSCular route once as needed.  hydrOXYzine HCl (ATARAX) 25 mg tablet Take 25 mg by mouth three (3) times daily as needed for Itching.  potassium 99 mg tablet Take 99 mg by mouth daily.  cholecalciferol (VITAMIN D3) 1,000 unit cap Take 5,000 Units by mouth daily.          Past History     Past Medical History:  Past Medical History:   Diagnosis Date    Acute sinusitis     Adverse effect of anesthesia     hard to awaken    Ankylosing spondylitis ([address]     Asthma     sports and allergy induced    Baker's cyst     knees    Carpal tunnel syndrome     Crohn disease ([address]     Deep venous thrombosis ([address] 2009    right leg    Diverticulitis     Edema     while sitting long periors    Tammi-Danlos syndrome     Endometriosis     Fibromyalgia     GERD (gastroesophageal reflux disease)     [place]'s disease     Heart burn     Hiatal hernia     Hypothyroid     goiter hashimoto    IBD (inflammatory bowel disease)     Irregular heart beat     Lumbar degenerative disc disease     lumbar disc displacement    Lumbosacral spondylosis     Nausea & vomiting     wants IV as the oral did not keep her from multi vomitins    Osteoarthritis     Rectocele     Recurrent boils     Rheumatoid arthritis (HCC)     SOBOE (shortness of breath on exertion)     SVT (supraventricular tachycardia) ([address] 2019    TMJ (dislocation of temporomandibular joint)     Ulcerative colitis ([address]  [facility]ative colitis [facility]     Raffy Parkinson White pattern seen on electrocardiogram     Raffy-Parkinson-White (WPW) pattern 2019       Past Surgical History:  Past Surgical History:   Procedure Laterality Date    CARDIAC SURG PROCEDURE UNLIST  11/29/2019    svt ablation    HX BREAST REDUCTION Bilateral 11/15/2018    HX GYN      rectocele repair x 3    HX GYN  2020    labialoplasty with vaginal scar     HX HEENT      Septoplasty    HX HEENT      esophageal stretch *10    HX HYSTERECTOMY  1995    HX ORTHOPAEDIC Right 1997    bunionectomy, lujan's neuroma    HX ORTHOPAEDIC Right 2014    Thumb surgery    HX OTHER SURGICAL  2019    Scar revision    HX SHOULDER ARTHROSCOPY Left 2019    HX TONSILLECTOMY  1994    HX UROLOGICAL  08/30/2007, 2020    cystoscopy , ureteral stent    HX UROLOGICAL      bladder repair    HX UROLOGICAL  2005    Posterior repair, rectocele repair    HX UROLOGICAL  2016    cystoscopy    HX UROLOGICAL  2017    cystocelectomy    HX UROLOGICAL  03/2018    cystocelectomy       Family History:  Family History   Problem Relation Age of Onset   Edna.Liliane Arthritis-rheumatoid Mother     Lung Disease Mother     Asthma Mother     Asthma Father     Lung Disease Sister     Diabetes Sister     Arthritis-rheumatoid Sister     Ulcerative Colitis Sister     MS Sister     Arthritis-rheumatoid Sister     Ulcerative Colitis Sister     Lung Disease Sister     Diabetes Brother        Social History:  Social History     Tobacco Use    Smoking status: Never Smoker    Smokeless tobacco: Never Used   Substance Use Topics    Alcohol use: No    Drug use: Never       Allergies:   Allergies   Allergen Reactions    Latex Hives     At contact site    Other Food Hives and Other (comments)     SUGAR, TOMATO, POTATO, CHICKEN, GARLIC, LETTUCE, MILK, TEA, PEANUTS, CHOCOLATE, CORN, BREWERS YEAST, EGGS, ONIONS, CARROT, RICE    Nickel Itching and Swelling    Penicillins Hives    Adhesive Tape-Silicones Hives     Other reaction(s): Dermatological problems, e.g., rash, hives    Bee Venom Protein (Honey Bee) Hives and Rash    Ceclor [Cefaclor] Hives    Celery Itching and Swelling    Ciprofloxacin Hives    Cleocin [Clindamycin Phosphate] Swelling    Clindamycin Rash    Dilaudid [Hydromorphone] Swelling    Egg Hives     Can not take flu vaccine    Levaquin [Levofloxacin] Rash    Methylprednisolone Hives and Rash    Mold Hives    Mushroom Itching and Swelling    Neomycin Other (comments)     allergy test    Neosporin [Benzalkonium Chloride] Itching    Nsaids (Non-Steroidal Anti-Inflammatory Drug) Hives    Nuts [Tree Nut] Itching and Swelling    Other Plant, Animal, Environmental Hives     SEE LIST    Potato Rash and Swelling    Sulfa (Sulfonamide Antibiotics) Rash     Break out in rash all over       Review of Systems   Review of Systems   Constitutional: Negative for chills and fever. Respiratory: Negative for shortness of breath. Cardiovascular: Negative for chest pain. Gastrointestinal: Negative for abdominal pain, diarrhea, nausea and vomiting. Musculoskeletal:        Right leg pain      Skin: Positive for wound. Neurological: Negative for weakness and numbness. All other systems reviewed and are negative. Physical Exam     Vitals:    10/07/20 1422 10/07/20 1510   BP: (!) 138/91    Pulse: (!) 102    Resp: 16    Temp: 97.8 °F (36.6 °C)    SpO2: 100% 100%   Weight: 98.4 kg (217 lb)    Height: 5' 3\" (1.6 m)      Physical Exam  Vitals signs and nursing note reviewed. Constitutional:       Appearance: She is well-developed. Comments: Patient is alert and nontoxic   HENT:      Head: Normocephalic and atraumatic. Neck:      Musculoskeletal: Normal range of motion and neck supple. Cardiovascular:      Rate and Rhythm: Normal rate and regular rhythm. Heart sounds: Normal heart sounds. No murmur.    Pulmonary:      Effort: Pulmonary effort is normal. No respiratory distress. Breath sounds: Normal breath sounds. No wheezing or rales. Abdominal:      General: Bowel sounds are normal.      Palpations: Abdomen is soft. Tenderness: There is no abdominal tenderness. Musculoskeletal:        Feet:       Comments: Left calf TTP, no redness or obvious swelling, pulses 2+ for DP and PT   Skin:     Comments: See musculoskeletal exam   Neurological:      Mental Status: She is alert and oriented to person, place, and time. Psychiatric:         Judgment: Judgment normal.         Diagnostic Study Results     Labs:   No results found for this or any previous visit (from the past 12 hour(s)). Radiologic Studies:  No orders to display     CT Results  (Last 48 hours)    None        CXR Results  (Last 48 hours)    None          Medical Decision Making   I am the first provider for this patient. I reviewed the vital signs, available nursing notes, past medical history, past surgical history, family history and social history. Vital Signs: Reviewed the patient's vital signs. Pulse Oximetry Analysis: 100% on RA       Records Reviewed: Nursing Notes and Old Medical Records    Procedures:  Procedures    ED Course:   3:16 PM Initial assessment performed. The patients presenting problems have been discussed, and they are in agreement with the care plan formulated and outlined with them. I have encouraged them to ask questions as they arise throughout their visit. 4:00 PM  Case discussed with Dr. Leena Coyle, aware negative for DVT. Agrees with starting doxy. Will see patient. She may need to follow back up with infectious disease if no improvement with Doxy. Discussion:  Pt presents with right leg pain sent for concern for DVT. No DVT seen on PVL. She does have some redness surrounding postsurgical wound site that she states has gotten bigger over the past couple days. She is afebrile and nontoxic.   She has extensive allergy list however she is able to tolerate doxycycline. Will start on doxycycline and have patient follow-up with her Ortho. . Strict return precautions given, pt offering no questions or complaints. Diagnosis and Disposition     DISCHARGE NOTE:  Javy Alvarado's  results have been reviewed with her. She has been counseled regarding her diagnosis, treatment, and plan. She verbally conveys understanding and agreement of the signs, symptoms, diagnosis, treatment and prognosis and additionally agrees to follow up as discussed. She also agrees with the care-plan and conveys that all of her questions have been answered. I have also provided discharge instructions for her that include: educational information regarding their diagnosis and treatment, and list of reasons why they would want to return to the ED prior to their follow-up appointment, should her condition change. She has been provided with education for proper emergency department utilization. CLINICAL IMPRESSION:    1. Arthralgia of right lower leg    2. Postoperative wound infection        PLAN:  1. D/C Home  2. Current Discharge Medication List      START taking these medications    Details   doxycycline (VIBRA-TABS) 100 mg tablet Take 1 Tab by mouth two (2) times a day for 10 days. Qty: 20 Tab, Refills: 0         STOP taking these medications       doxycycline (VIBRAMYCIN) 100 mg capsule Comments:   Reason for Stopping:             3.   Follow-up Information     Follow up With Specialties Details Why Contact Info    Darius Rodriguez MD Orthopedic Surgery  [facility] for follow up and recheck  [address]  [phone]      THE [facility] EMERGENCY DEPT Emergency Medicine  If symptoms worsen [address] [place] [id number] [phone]                 Please note that this dictation was completed with [employer] the [employer] voice recognition software.   Quite often unanticipated grammatical, syntax, homophones, and other interpretive errors are inadvertently transcribed by the computer software. Please disregard these errors. Please excuse any errors that have escaped final proofreading.      \

## 2020-10-07 NOTE — ED NOTES
I have reviewed discharge instructions with the patient. The patient verbalized understanding. NAD. VSS. Ambulatory with steady gait with alonso. Denies SOB or chest pain. Neurovascular itnact. AOx4.

## 2020-10-08 ENCOUNTER — HOME CARE VISIT (OUTPATIENT)
Dept: SCHEDULING | Facility: HOME HEALTH | Age: 60
End: 2020-10-08
Payer: COMMERCIAL

## 2020-10-08 ENCOUNTER — HOME CARE VISIT (OUTPATIENT)
Dept: HOME HEALTH SERVICES | Facility: HOME HEALTH | Age: 60
End: 2020-10-08

## 2020-10-08 PROCEDURE — G0299 HHS/HOSPICE OF RN EA 15 MIN: HCPCS

## 2020-10-08 PROCEDURE — 400013 HH SOC

## 2020-10-09 ENCOUNTER — HOME CARE VISIT (OUTPATIENT)
Dept: HOME HEALTH SERVICES | Facility: HOME HEALTH | Age: 60
End: 2020-10-09
Payer: COMMERCIAL

## 2020-10-13 ENCOUNTER — HOME CARE VISIT (OUTPATIENT)
Dept: SCHEDULING | Facility: HOME HEALTH | Age: 60
End: 2020-10-13
Payer: COMMERCIAL

## 2020-10-13 PROCEDURE — G0299 HHS/HOSPICE OF RN EA 15 MIN: HCPCS

## 2020-10-15 ENCOUNTER — HOME CARE VISIT (OUTPATIENT)
Dept: HOME HEALTH SERVICES | Facility: HOME HEALTH | Age: 60
End: 2020-10-15
Payer: COMMERCIAL

## 2020-10-15 VITALS
HEART RATE: 78 BPM | SYSTOLIC BLOOD PRESSURE: 122 MMHG | TEMPERATURE: 97.8 F | OXYGEN SATURATION: 98 % | RESPIRATION RATE: 16 BRPM | DIASTOLIC BLOOD PRESSURE: 76 MMHG

## 2020-10-16 ENCOUNTER — HOME CARE VISIT (OUTPATIENT)
Dept: SCHEDULING | Facility: HOME HEALTH | Age: 60
End: 2020-10-16
Payer: COMMERCIAL

## 2020-10-16 VITALS
HEART RATE: 88 BPM | TEMPERATURE: 96.3 F | OXYGEN SATURATION: 98 % | SYSTOLIC BLOOD PRESSURE: 120 MMHG | DIASTOLIC BLOOD PRESSURE: 80 MMHG

## 2020-10-16 PROCEDURE — A4216 STERILE WATER/SALINE, 10 ML: HCPCS

## 2020-10-16 PROCEDURE — A6446 CONFORM BAND S W>=3" <5"/YD: HCPCS

## 2020-10-16 PROCEDURE — A6216 NON-STERILE GAUZE<=16 SQ IN: HCPCS

## 2020-10-16 PROCEDURE — G0299 HHS/HOSPICE OF RN EA 15 MIN: HCPCS

## 2020-10-20 ENCOUNTER — HOME CARE VISIT (OUTPATIENT)
Dept: SCHEDULING | Facility: HOME HEALTH | Age: 60
End: 2020-10-20
Payer: COMMERCIAL

## 2020-10-20 VITALS — OXYGEN SATURATION: 97 % | RESPIRATION RATE: 18 BRPM | HEART RATE: 87 BPM | TEMPERATURE: 97.2 F

## 2020-10-20 PROCEDURE — G0299 HHS/HOSPICE OF RN EA 15 MIN: HCPCS

## 2020-10-22 ENCOUNTER — HOME CARE VISIT (OUTPATIENT)
Dept: SCHEDULING | Facility: HOME HEALTH | Age: 60
End: 2020-10-22
Payer: COMMERCIAL

## 2020-10-22 PROCEDURE — G0299 HHS/HOSPICE OF RN EA 15 MIN: HCPCS

## 2020-10-24 VITALS
HEART RATE: 86 BPM | OXYGEN SATURATION: 98 % | RESPIRATION RATE: 16 BRPM | DIASTOLIC BLOOD PRESSURE: 82 MMHG | SYSTOLIC BLOOD PRESSURE: 128 MMHG | TEMPERATURE: 98.2 F

## 2020-10-26 VITALS
BODY MASS INDEX: 38.45 KG/M2 | TEMPERATURE: 98.8 F | DIASTOLIC BLOOD PRESSURE: 82 MMHG | HEART RATE: 88 BPM | HEIGHT: 63 IN | WEIGHT: 217 LBS | RESPIRATION RATE: 16 BRPM | SYSTOLIC BLOOD PRESSURE: 130 MMHG | OXYGEN SATURATION: 97 %

## 2020-10-28 ENCOUNTER — HOME CARE VISIT (OUTPATIENT)
Dept: SCHEDULING | Facility: HOME HEALTH | Age: 60
End: 2020-10-28
Payer: COMMERCIAL

## 2020-10-28 VITALS
SYSTOLIC BLOOD PRESSURE: 136 MMHG | TEMPERATURE: 98.3 F | RESPIRATION RATE: 16 BRPM | DIASTOLIC BLOOD PRESSURE: 80 MMHG | HEART RATE: 72 BPM | OXYGEN SATURATION: 98 %

## 2020-10-28 PROCEDURE — G0299 HHS/HOSPICE OF RN EA 15 MIN: HCPCS

## (undated) DEVICE — SUT MCRYL + 3-0 27IN PS1 UD --

## (undated) DEVICE — SOL IRRIGATION INJ NACL 0.9% 500ML BTL

## (undated) DEVICE — INTENDED FOR TISSUE SEPARATION, AND OTHER PROCEDURES THAT REQUIRE A SHARP SURGICAL BLADE TO PUNCTURE OR CUT.: Brand: BARD-PARKER ® CARBON RIB-BACK BLADES

## (undated) DEVICE — STRIP WND CLSR FLX SELF ADH NO [TP1103] [INTEGRA LIFESCIENCES CORP]

## (undated) DEVICE — PACK PROCEDURE SURG EXTREMITY CUST

## (undated) DEVICE — DRAPE XR C ARM 41X74IN LF --

## (undated) DEVICE — DRAPE TWL SURG 16X26IN BLU ORB04] ALLCARE INC]

## (undated) DEVICE — SUTURE MCRYL SZ 3-0 L27IN ABSRB UD L24MM PS-1 3/8 CIR PRIM Y936H

## (undated) DEVICE — TOWEL,OR,DSP,ST,BLUE,STD,4/PK,20PK/CS: Brand: MEDLINE

## (undated) DEVICE — PAD,ABDOMINAL,5"X9",ST,LF,25/BX: Brand: MEDLINE INDUSTRIES, INC.

## (undated) DEVICE — SWAB CULT LIQ STUART AGR AERB MOD IN BRK SGL RAYON TIP PLAS 220099] BECTON DICKINSON MICRO]

## (undated) DEVICE — SEALANT KT FIBRIN HEMSTAT 4ML -- TISSEEL

## (undated) DEVICE — SUT PROL 0 30IN CTX BLU --

## (undated) DEVICE — TUBING SMK EVAC L3M DIA2.2CM SPNG GRD PREFLTR ADPT FLTR

## (undated) DEVICE — PIN SFTY L L2IN S STL FOR GRP HLD AND RET

## (undated) DEVICE — SPONGE LAP 18X18IN STRL -- 5/PK

## (undated) DEVICE — GARMENT,MEDLINE,DVT,INT,CALF,MED, GEN2: Brand: MEDLINE

## (undated) DEVICE — SUPPORT MAMM SURG 48-50 IN 2XL BRA

## (undated) DEVICE — DRAPE,CHEST,FENES,15X10,STERIL: Brand: MEDLINE

## (undated) DEVICE — 5.5 MM STONECUTTER STRAIGHT BURRS,                                    POWER/EP-1, OLIVE, 8000 MAXIMUM RPM,                                    PACKAGED 6 PER BOX, STERILE

## (undated) DEVICE — Device

## (undated) DEVICE — GOWN,AURORA,NONRNF,XL,30/CS: Brand: MEDLINE

## (undated) DEVICE — 3L THIN WALL CAN: Brand: CRD

## (undated) DEVICE — BNDG,ELSTC,MATRIX,STRL,6"X5YD,LF,HOOK&LP: Brand: MEDLINE

## (undated) DEVICE — DRAPE EQUIP CARM MINI STRL

## (undated) DEVICE — STERILE POLYISOPRENE POWDER-FREE SURGICAL GLOVES: Brand: PROTEXIS

## (undated) DEVICE — STRAP,POSITIONING,KNEE/BODY,FOAM,4X60": Brand: MEDLINE

## (undated) DEVICE — SHOULDER SUSPENSION KIT 6 PER BOX

## (undated) DEVICE — PREP SKN CHLRAPRP APL 26ML STR --

## (undated) DEVICE — 3M™ STERI-DRAPE™ INCISE DRAPE 1050 (60CM X 45CM): Brand: STERI-DRAPE™

## (undated) DEVICE — PADDING CAST W4INXL4YD ST COT COHESIVE HND TEARABLE SPEC

## (undated) DEVICE — SUTURE VCRL + SZ 3-0 L18IN ABSRB UD SH 1/2 CIR TAPERCUT NDL VCP864D

## (undated) DEVICE — TRAY PREP DRY W/ PREM GLV 2 APPL 6 SPNG 2 UNDPD 1 OVERWRAP

## (undated) DEVICE — KENDALL SCD EXPRESS SLEEVES, KNEE LENGTH, MEDIUM: Brand: KENDALL SCD

## (undated) DEVICE — HEX-LOCKING BLADE ELECTRODE: Brand: EDGE

## (undated) DEVICE — TUBE IRRIG L8IN LNG PT W/ CONN FOR PMP SYS REDEUCE

## (undated) DEVICE — MAJOR: Brand: MEDLINE INDUSTRIES, INC.

## (undated) DEVICE — SUTURE NRLN SZ 1 L18IN NONABSORBABLE BLK L36MM CT-1 1/2 CIR C520D

## (undated) DEVICE — TUBING PMP L8FT LNG W/ CONN FOR AR-6400 REDEUCE

## (undated) DEVICE — SUT ETHLN 2-0 18IN FS BLK --

## (undated) DEVICE — SLEEVE COMPR THGH LEN MED TEAR AWAY GARM SCD EXPRESS [DVT30] [MEDTRONIC COVIDIEN KENDALL]

## (undated) DEVICE — DRAIN SURG L0.75IN TRCR

## (undated) DEVICE — 3-0 COATED VICRYL PLUS UNDYED 1X27" SH --

## (undated) DEVICE — PAD,ABDOMINAL,5"X9",STERILE,LF,1/PK: Brand: MEDLINE INDUSTRIES, INC.

## (undated) DEVICE — SUT ETHLN 3-0 18IN PS2 BLK --

## (undated) DEVICE — SPONGE DRAIN NONWOVEN 4X4IN -- 2/PK

## (undated) DEVICE — 4-PORT MANIFOLD: Brand: NEPTUNE 2

## (undated) DEVICE — BANDAGE COMPR W4INXL10YD WHITE/BEIGE E MTRX HK LOOP CLSR

## (undated) DEVICE — Z DISCONTINUED USE 2273478 SCALPEL SURG NO15 PLAS STR DISP

## (undated) DEVICE — ZIMMER® STERILE DISPOSABLE TOURNIQUET CUFF WITH PROTECTIVE SLEEVE AND PLC, SINGLE PORT, SINGLE BLADDER, 34 IN. (86 CM)

## (undated) DEVICE — SLEEVE COMPR TED THGH LEN 33IN --

## (undated) DEVICE — NON-ADHERING DRESSING: Brand: ADAPTIC®

## (undated) DEVICE — UNDERCAST PADDING: Brand: DEROYAL

## (undated) DEVICE — CANISTER SUCTION HI FLO 30000CC FLUID MGMT SYS TRUCLEAR DISP

## (undated) DEVICE — SUPER TURBOVAC 90 INTEGRATED CABLE WAND ICW: Brand: COBLATION

## (undated) DEVICE — 4.5 MM INCISOR PLUS STRAIGHT                                    BLADES, POWER/EP-1, VIOLET, PACKAGED                                    6 PER BOX, STERILE

## (undated) DEVICE — DYONICS 2.9 MM FULL RADIUS BLADES,                                    7 CM LENGTH, RED, PACKAGED 6 PER                                    BOX, STERILE: Brand: DYONICS POWERMINI

## (undated) DEVICE — DEVON™ KNEE AND BODY STRAP 60" X 3" (1.5 M X 7.6 CM): Brand: DEVON

## (undated) DEVICE — REM POLYHESIVE ADULT PATIENT RETURN ELECTRODE: Brand: VALLEYLAB

## (undated) DEVICE — SOLUTION IRRIG 3000ML LAC R FLX CONT

## (undated) DEVICE — SUT ETHLN 3-0 18IN PS1 BLK --

## (undated) DEVICE — PREP SKN PREVAIL 40ML APPL --

## (undated) DEVICE — NEEDLE HYPO 21GA L1.5IN INTRAMUSCULAR S STL LATCH BVL UP

## (undated) DEVICE — NEEDLE BX 11GA L101MM BNE MAR ASPIR W/ ADPT JAMSH

## (undated) DEVICE — STRIP SKIN CLSR W1XL5IN NYL REINF CURAD

## (undated) DEVICE — PACKING 8004004 NEURAY 200PK 13X38MM: Brand: NEURAY ®

## (undated) DEVICE — SUT MONOCRYL PLUS UD 4-0 --

## (undated) DEVICE — PACK PROCEDURE SURG ORTH SHLDR CUST

## (undated) DEVICE — GUHL ANKLE DISTRACTOR FOOT STRAPS,                                    STERILE, LATEX FREE BOX OF 6

## (undated) DEVICE — GARMENT,MEDLINE,DVT,INT,CALF,LG, GEN2: Brand: MEDLINE

## (undated) DEVICE — SWAB CULT SGL AMIES W/O CHAR FOR THRT VAG SKIN HRT CULTSWAB

## (undated) DEVICE — SUTURE VCRL SZ 4-0 L18IN ABSRB UD L19MM PS-2 3/8 CIR PRIM J496H

## (undated) DEVICE — MASTISOL ADHESIVE LIQ 2/3ML

## (undated) DEVICE — KIT INFUS PMP 270ML 2M/HR SOAK CATH L2.5IN N NARC ON-Q

## (undated) DEVICE — DBD-PACK,LAPAROTOMY,2 REINFORCED GOWNS: Brand: MEDLINE